# Patient Record
Sex: FEMALE | Race: WHITE | HISPANIC OR LATINO | Employment: UNEMPLOYED | URBAN - METROPOLITAN AREA
[De-identification: names, ages, dates, MRNs, and addresses within clinical notes are randomized per-mention and may not be internally consistent; named-entity substitution may affect disease eponyms.]

---

## 2021-08-06 ENCOUNTER — HOSPITAL ENCOUNTER (EMERGENCY)
Facility: HOSPITAL | Age: 56
Discharge: HOME | End: 2021-08-07
Attending: EMERGENCY MEDICINE | Admitting: EMERGENCY MEDICINE
Payer: COMMERCIAL

## 2021-08-06 DIAGNOSIS — R73.9 HYPERGLYCEMIA: Primary | ICD-10-CM

## 2021-08-06 PROCEDURE — 99285 EMERGENCY DEPT VISIT HI MDM: CPT

## 2021-08-07 VITALS
WEIGHT: 178.57 LBS | HEART RATE: 81 BPM | RESPIRATION RATE: 19 BRPM | TEMPERATURE: 98.4 F | OXYGEN SATURATION: 98 % | SYSTOLIC BLOOD PRESSURE: 173 MMHG | DIASTOLIC BLOOD PRESSURE: 87 MMHG

## 2021-08-07 LAB
ALBUMIN SERPL BCP-MCNC: 3.6 G/DL (ref 3.5–5)
ALP SERPL-CCNC: 159 U/L (ref 46–116)
ALT SERPL W P-5'-P-CCNC: 38 U/L (ref 12–78)
ANION GAP SERPL CALCULATED.3IONS-SCNC: 10 MMOL/L (ref 4–13)
AST SERPL W P-5'-P-CCNC: 12 U/L (ref 5–45)
ATRIAL RATE: 69 BPM
BACTERIA UR QL AUTO: ABNORMAL /HPF
BASE EX.OXY STD BLDV CALC-SCNC: 89.2 % (ref 60–80)
BASE EXCESS BLDV CALC-SCNC: -1.7 MMOL/L
BASOPHILS # BLD AUTO: 0.05 THOUSANDS/ΜL (ref 0–0.1)
BASOPHILS NFR BLD AUTO: 1 % (ref 0–1)
BETA-HYDROXYBUTYRATE: 0.1 MMOL/L
BILIRUB SERPL-MCNC: 0.3 MG/DL (ref 0.2–1)
BILIRUB UR QL STRIP: NEGATIVE
BUN SERPL-MCNC: 21 MG/DL (ref 5–25)
CALCIUM SERPL-MCNC: 8.8 MG/DL (ref 8.3–10.1)
CHLORIDE SERPL-SCNC: 102 MMOL/L (ref 100–108)
CLARITY UR: CLEAR
CO2 SERPL-SCNC: 26 MMOL/L (ref 21–32)
COLOR UR: YELLOW
CREAT SERPL-MCNC: 0.88 MG/DL (ref 0.6–1.3)
EOSINOPHIL # BLD AUTO: 0.1 THOUSAND/ΜL (ref 0–0.61)
EOSINOPHIL NFR BLD AUTO: 1 % (ref 0–6)
ERYTHROCYTE [DISTWIDTH] IN BLOOD BY AUTOMATED COUNT: 13.3 % (ref 11.6–15.1)
GFR SERPL CREATININE-BSD FRML MDRD: 74 ML/MIN/1.73SQ M
GLUCOSE SERPL-MCNC: 183 MG/DL (ref 65–140)
GLUCOSE SERPL-MCNC: 196 MG/DL (ref 65–140)
GLUCOSE UR STRIP-MCNC: ABNORMAL MG/DL
HCO3 BLDV-SCNC: 22.1 MMOL/L (ref 24–30)
HCT VFR BLD AUTO: 42 % (ref 34.8–46.1)
HGB BLD-MCNC: 14 G/DL (ref 11.5–15.4)
HGB UR QL STRIP.AUTO: ABNORMAL
IMM GRANULOCYTES # BLD AUTO: 0.04 THOUSAND/UL (ref 0–0.2)
IMM GRANULOCYTES NFR BLD AUTO: 0 % (ref 0–2)
KETONES UR STRIP-MCNC: NEGATIVE MG/DL
LEUKOCYTE ESTERASE UR QL STRIP: ABNORMAL
LYMPHOCYTES # BLD AUTO: 2.6 THOUSANDS/ΜL (ref 0.6–4.47)
LYMPHOCYTES NFR BLD AUTO: 26 % (ref 14–44)
MCH RBC QN AUTO: 28.9 PG (ref 26.8–34.3)
MCHC RBC AUTO-ENTMCNC: 33.3 G/DL (ref 31.4–37.4)
MCV RBC AUTO: 87 FL (ref 82–98)
MONOCYTES # BLD AUTO: 0.64 THOUSAND/ΜL (ref 0.17–1.22)
MONOCYTES NFR BLD AUTO: 6 % (ref 4–12)
NEUTROPHILS # BLD AUTO: 6.69 THOUSANDS/ΜL (ref 1.85–7.62)
NEUTS SEG NFR BLD AUTO: 66 % (ref 43–75)
NITRITE UR QL STRIP: NEGATIVE
NON-SQ EPI CELLS URNS QL MICRO: ABNORMAL /HPF
NRBC BLD AUTO-RTO: 0 /100 WBCS
O2 CT BLDV-SCNC: 18.8 ML/DL
P AXIS: 47 DEGREES
PCO2 BLDV: 34.7 MM HG (ref 42–50)
PH BLDV: 7.42 [PH] (ref 7.3–7.4)
PH UR STRIP.AUTO: 6 [PH]
PLATELET # BLD AUTO: 287 THOUSANDS/UL (ref 149–390)
PMV BLD AUTO: 10.2 FL (ref 8.9–12.7)
PO2 BLDV: 62.1 MM HG (ref 35–45)
POTASSIUM SERPL-SCNC: 3.5 MMOL/L (ref 3.5–5.3)
PR INTERVAL: 140 MS
PROT SERPL-MCNC: 7.2 G/DL (ref 6.4–8.2)
PROT UR STRIP-MCNC: NEGATIVE MG/DL
QRS AXIS: 32 DEGREES
QRSD INTERVAL: 78 MS
QT INTERVAL: 414 MS
QTC INTERVAL: 443 MS
RBC # BLD AUTO: 4.85 MILLION/UL (ref 3.81–5.12)
RBC #/AREA URNS AUTO: ABNORMAL /HPF
SODIUM SERPL-SCNC: 138 MMOL/L (ref 136–145)
SP GR UR STRIP.AUTO: 1.01 (ref 1–1.03)
T WAVE AXIS: -2 DEGREES
UROBILINOGEN UR QL STRIP.AUTO: 0.2 E.U./DL
VENTRICULAR RATE: 69 BPM
WBC # BLD AUTO: 10.12 THOUSAND/UL (ref 4.31–10.16)
WBC #/AREA URNS AUTO: ABNORMAL /HPF

## 2021-08-07 PROCEDURE — 80053 COMPREHEN METABOLIC PANEL: CPT | Performed by: EMERGENCY MEDICINE

## 2021-08-07 PROCEDURE — 85025 COMPLETE CBC W/AUTO DIFF WBC: CPT | Performed by: EMERGENCY MEDICINE

## 2021-08-07 PROCEDURE — 93010 ELECTROCARDIOGRAM REPORT: CPT | Performed by: INTERNAL MEDICINE

## 2021-08-07 PROCEDURE — 99282 EMERGENCY DEPT VISIT SF MDM: CPT | Performed by: EMERGENCY MEDICINE

## 2021-08-07 PROCEDURE — 96360 HYDRATION IV INFUSION INIT: CPT

## 2021-08-07 PROCEDURE — 36415 COLL VENOUS BLD VENIPUNCTURE: CPT | Performed by: EMERGENCY MEDICINE

## 2021-08-07 PROCEDURE — 82010 KETONE BODYS QUAN: CPT | Performed by: EMERGENCY MEDICINE

## 2021-08-07 PROCEDURE — 82948 REAGENT STRIP/BLOOD GLUCOSE: CPT

## 2021-08-07 PROCEDURE — 93005 ELECTROCARDIOGRAM TRACING: CPT

## 2021-08-07 PROCEDURE — 82805 BLOOD GASES W/O2 SATURATION: CPT | Performed by: EMERGENCY MEDICINE

## 2021-08-07 PROCEDURE — 81001 URINALYSIS AUTO W/SCOPE: CPT | Performed by: EMERGENCY MEDICINE

## 2021-08-07 RX ADMIN — SODIUM CHLORIDE 1000 ML: 0.9 INJECTION, SOLUTION INTRAVENOUS at 00:46

## 2021-08-07 NOTE — ED NOTES
Pt states her CBG was >600 at home   She dosed herself with 20 units of Novolog prior to arrival     Gabrielle Newman, [facility]  08/07/21 2013

## 2021-08-20 ENCOUNTER — HOSPITAL ENCOUNTER (OUTPATIENT)
Facility: HOSPITAL | Age: 56
Setting detail: OBSERVATION
Discharge: HOME | End: 2021-08-21
Attending: EMERGENCY MEDICINE | Admitting: FAMILY MEDICINE
Payer: COMMERCIAL

## 2021-08-20 ENCOUNTER — APPOINTMENT (OUTPATIENT)
Dept: MRI IMAGING | Facility: HOSPITAL | Age: 56
End: 2021-08-20
Payer: COMMERCIAL

## 2021-08-20 ENCOUNTER — APPOINTMENT (EMERGENCY)
Dept: RADIOLOGY | Facility: HOSPITAL | Age: 56
End: 2021-08-20
Payer: COMMERCIAL

## 2021-08-20 ENCOUNTER — APPOINTMENT (EMERGENCY)
Dept: CT IMAGING | Facility: HOSPITAL | Age: 56
End: 2021-08-20
Payer: COMMERCIAL

## 2021-08-20 DIAGNOSIS — Z79.4 TYPE 2 DIABETES MELLITUS WITH STAGE 3 CHRONIC KIDNEY DISEASE, WITH LONG-TERM CURRENT USE OF INSULIN, UNSPECIFIED WHETHER STAGE 3A OR 3B CKD (HCC): ICD-10-CM

## 2021-08-20 DIAGNOSIS — R29.90 STROKE-LIKE SYMPTOMS: ICD-10-CM

## 2021-08-20 DIAGNOSIS — N18.30 TYPE 2 DIABETES MELLITUS WITH STAGE 3 CHRONIC KIDNEY DISEASE, WITH LONG-TERM CURRENT USE OF INSULIN, UNSPECIFIED WHETHER STAGE 3A OR 3B CKD (HCC): ICD-10-CM

## 2021-08-20 DIAGNOSIS — E11.22 TYPE 2 DIABETES MELLITUS WITH STAGE 3 CHRONIC KIDNEY DISEASE, WITH LONG-TERM CURRENT USE OF INSULIN, UNSPECIFIED WHETHER STAGE 3A OR 3B CKD (HCC): ICD-10-CM

## 2021-08-20 DIAGNOSIS — I67.4 HYPERTENSIVE ENCEPHALOPATHY: ICD-10-CM

## 2021-08-20 DIAGNOSIS — R51.9 HEADACHE: Primary | ICD-10-CM

## 2021-08-20 DIAGNOSIS — I10 ESSENTIAL HYPERTENSION: ICD-10-CM

## 2021-08-20 DIAGNOSIS — I10 HYPERTENSION: ICD-10-CM

## 2021-08-20 PROBLEM — Z87.891 SMOKING HISTORY: Status: ACTIVE | Noted: 2021-03-21

## 2021-08-20 PROBLEM — F32.A DEPRESSION: Status: ACTIVE | Noted: 2021-03-21

## 2021-08-20 PROBLEM — I16.0 HYPERTENSIVE URGENCY, MALIGNANT: Status: ACTIVE | Noted: 2021-08-20

## 2021-08-20 LAB
ANION GAP SERPL CALCULATED.3IONS-SCNC: 8 MMOL/L (ref 4–13)
APTT PPP: 29 SECONDS (ref 23–37)
BUN SERPL-MCNC: 16 MG/DL (ref 5–25)
CALCIUM SERPL-MCNC: 8.5 MG/DL (ref 8.3–10.1)
CHLORIDE SERPL-SCNC: 105 MMOL/L (ref 100–108)
CHOLEST SERPL-MCNC: 227 MG/DL (ref 50–200)
CO2 SERPL-SCNC: 26 MMOL/L (ref 21–32)
CREAT SERPL-MCNC: 1.07 MG/DL (ref 0.6–1.3)
ERYTHROCYTE [DISTWIDTH] IN BLOOD BY AUTOMATED COUNT: 13.6 % (ref 11.6–15.1)
GFR SERPL CREATININE-BSD FRML MDRD: 58 ML/MIN/1.73SQ M
GLUCOSE SERPL-MCNC: 162 MG/DL (ref 65–140)
HCT VFR BLD AUTO: 42.6 % (ref 34.8–46.1)
HDLC SERPL-MCNC: 48 MG/DL
HGB BLD-MCNC: 13.5 G/DL (ref 11.5–15.4)
INR PPP: 0.89 (ref 0.84–1.19)
LDLC SERPL CALC-MCNC: 149 MG/DL (ref 0–100)
MCH RBC QN AUTO: 28.7 PG (ref 26.8–34.3)
MCHC RBC AUTO-ENTMCNC: 31.7 G/DL (ref 31.4–37.4)
MCV RBC AUTO: 90 FL (ref 82–98)
PLATELET # BLD AUTO: 301 THOUSANDS/UL (ref 149–390)
PMV BLD AUTO: 10 FL (ref 8.9–12.7)
POTASSIUM SERPL-SCNC: 4.3 MMOL/L (ref 3.5–5.3)
PROTHROMBIN TIME: 11.6 SECONDS (ref 11.6–14.5)
RBC # BLD AUTO: 4.71 MILLION/UL (ref 3.81–5.12)
SARS-COV-2 RNA RESP QL NAA+PROBE: NEGATIVE
SODIUM SERPL-SCNC: 139 MMOL/L (ref 136–145)
TRIGL SERPL-MCNC: 148 MG/DL
TROPONIN I SERPL-MCNC: <0.02 NG/ML
WBC # BLD AUTO: 8.94 THOUSAND/UL (ref 4.31–10.16)

## 2021-08-20 PROCEDURE — 85027 COMPLETE CBC AUTOMATED: CPT | Performed by: PHYSICIAN ASSISTANT

## 2021-08-20 PROCEDURE — U0003 INFECTIOUS AGENT DETECTION BY NUCLEIC ACID (DNA OR RNA); SEVERE ACUTE RESPIRATORY SYNDROME CORONAVIRUS 2 (SARS-COV-2) (CORONAVIRUS DISEASE [COVID-19]), AMPLIFIED PROBE TECHNIQUE, MAKING USE OF HIGH THROUGHPUT TECHNOLOGIES AS DESCRIBED BY CMS-2020-01-R: HCPCS | Performed by: PHYSICIAN ASSISTANT

## 2021-08-20 PROCEDURE — 36415 COLL VENOUS BLD VENIPUNCTURE: CPT | Performed by: PHYSICIAN ASSISTANT

## 2021-08-20 PROCEDURE — 71045 X-RAY EXAM CHEST 1 VIEW: CPT

## 2021-08-20 PROCEDURE — 96374 THER/PROPH/DIAG INJ IV PUSH: CPT

## 2021-08-20 PROCEDURE — 99285 EMERGENCY DEPT VISIT HI MDM: CPT | Performed by: PHYSICIAN ASSISTANT

## 2021-08-20 PROCEDURE — 70498 CT ANGIOGRAPHY NECK: CPT

## 2021-08-20 PROCEDURE — 99285 EMERGENCY DEPT VISIT HI MDM: CPT

## 2021-08-20 PROCEDURE — 85730 THROMBOPLASTIN TIME PARTIAL: CPT | Performed by: PHYSICIAN ASSISTANT

## 2021-08-20 PROCEDURE — 99205 OFFICE O/P NEW HI 60 MIN: CPT | Performed by: PSYCHIATRY & NEUROLOGY

## 2021-08-20 PROCEDURE — 70551 MRI BRAIN STEM W/O DYE: CPT

## 2021-08-20 PROCEDURE — 80048 BASIC METABOLIC PNL TOTAL CA: CPT | Performed by: PHYSICIAN ASSISTANT

## 2021-08-20 PROCEDURE — 99219 PR INITIAL OBSERVATION CARE/DAY 50 MINUTES: CPT | Performed by: FAMILY MEDICINE

## 2021-08-20 PROCEDURE — 80061 LIPID PANEL: CPT

## 2021-08-20 PROCEDURE — 93005 ELECTROCARDIOGRAM TRACING: CPT

## 2021-08-20 PROCEDURE — 85610 PROTHROMBIN TIME: CPT | Performed by: PHYSICIAN ASSISTANT

## 2021-08-20 PROCEDURE — 84484 ASSAY OF TROPONIN QUANT: CPT | Performed by: PHYSICIAN ASSISTANT

## 2021-08-20 PROCEDURE — G1004 CDSM NDSC: HCPCS

## 2021-08-20 PROCEDURE — 70496 CT ANGIOGRAPHY HEAD: CPT

## 2021-08-20 PROCEDURE — U0005 INFEC AGEN DETEC AMPLI PROBE: HCPCS | Performed by: PHYSICIAN ASSISTANT

## 2021-08-20 RX ORDER — QUETIAPINE FUMARATE 100 MG/1
100 TABLET, FILM COATED ORAL DAILY
COMMUNITY
Start: 2021-05-24

## 2021-08-20 RX ORDER — FAMOTIDINE 40 MG/1
40 TABLET, FILM COATED ORAL DAILY
COMMUNITY
Start: 2021-06-03

## 2021-08-20 RX ORDER — VALSARTAN 320 MG/1
320 TABLET ORAL DAILY
COMMUNITY

## 2021-08-20 RX ORDER — AMITRIPTYLINE HYDROCHLORIDE 25 MG/1
50 TABLET, FILM COATED ORAL DAILY
Status: DISCONTINUED | OUTPATIENT
Start: 2021-08-21 | End: 2021-08-21 | Stop reason: HOSPADM

## 2021-08-20 RX ORDER — VENLAFAXINE HYDROCHLORIDE 150 MG/1
150 CAPSULE, EXTENDED RELEASE ORAL DAILY
Status: DISCONTINUED | OUTPATIENT
Start: 2021-08-21 | End: 2021-08-21 | Stop reason: HOSPADM

## 2021-08-20 RX ORDER — HEPARIN SODIUM 5000 [USP'U]/ML
5000 INJECTION, SOLUTION INTRAVENOUS; SUBCUTANEOUS EVERY 8 HOURS SCHEDULED
Status: DISCONTINUED | OUTPATIENT
Start: 2021-08-20 | End: 2021-08-21 | Stop reason: HOSPADM

## 2021-08-20 RX ORDER — QUETIAPINE FUMARATE 100 MG/1
100 TABLET, FILM COATED ORAL DAILY
Status: DISCONTINUED | OUTPATIENT
Start: 2021-08-21 | End: 2021-08-21 | Stop reason: HOSPADM

## 2021-08-20 RX ORDER — ASPIRIN 81 MG/1
81 TABLET ORAL DAILY
Status: DISCONTINUED | OUTPATIENT
Start: 2021-08-21 | End: 2021-08-21 | Stop reason: HOSPADM

## 2021-08-20 RX ORDER — ASPIRIN 325 MG
325 TABLET ORAL ONCE
Status: COMPLETED | OUTPATIENT
Start: 2021-08-20 | End: 2021-08-20

## 2021-08-20 RX ORDER — VENLAFAXINE HYDROCHLORIDE 150 MG/1
150 CAPSULE, EXTENDED RELEASE ORAL DAILY
COMMUNITY
Start: 2021-05-24

## 2021-08-20 RX ORDER — GABAPENTIN 300 MG/1
600 CAPSULE ORAL 2 TIMES DAILY
Status: DISCONTINUED | OUTPATIENT
Start: 2021-08-20 | End: 2021-08-21 | Stop reason: HOSPADM

## 2021-08-20 RX ORDER — FAMOTIDINE 20 MG/1
40 TABLET, FILM COATED ORAL DAILY
Status: DISCONTINUED | OUTPATIENT
Start: 2021-08-21 | End: 2021-08-21 | Stop reason: HOSPADM

## 2021-08-20 RX ORDER — BISMUTH SUBCITRATE POTASSIUM, METRONIDAZOLE, TETRACYCLINE HYDROCHLORIDE 140; 125; 125 MG/1; MG/1; MG/1
3 CAPSULE ORAL 4 TIMES DAILY
COMMUNITY
Start: 2021-08-05

## 2021-08-20 RX ORDER — VENLAFAXINE HYDROCHLORIDE 37.5 MG/1
37.5 CAPSULE, EXTENDED RELEASE ORAL DAILY
COMMUNITY
Start: 2021-05-24

## 2021-08-20 RX ORDER — LABETALOL 20 MG/4 ML (5 MG/ML) INTRAVENOUS SYRINGE
10 ONCE
Status: COMPLETED | OUTPATIENT
Start: 2021-08-20 | End: 2021-08-20

## 2021-08-20 RX ORDER — ATORVASTATIN CALCIUM 40 MG/1
40 TABLET, FILM COATED ORAL EVERY EVENING
Status: DISCONTINUED | OUTPATIENT
Start: 2021-08-20 | End: 2021-08-21 | Stop reason: HOSPADM

## 2021-08-20 RX ORDER — VENLAFAXINE HYDROCHLORIDE 37.5 MG/1
37.5 CAPSULE, EXTENDED RELEASE ORAL DAILY
Status: DISCONTINUED | OUTPATIENT
Start: 2021-08-21 | End: 2021-08-21 | Stop reason: HOSPADM

## 2021-08-20 RX ORDER — AMITRIPTYLINE HYDROCHLORIDE 50 MG/1
50 TABLET, FILM COATED ORAL DAILY
COMMUNITY
Start: 2021-05-25

## 2021-08-20 RX ORDER — OMEPRAZOLE 40 MG/1
40 CAPSULE, DELAYED RELEASE ORAL DAILY
COMMUNITY
Start: 2021-06-03

## 2021-08-20 RX ORDER — PANTOPRAZOLE SODIUM 40 MG/1
40 TABLET, DELAYED RELEASE ORAL
Status: DISCONTINUED | OUTPATIENT
Start: 2021-08-21 | End: 2021-08-21 | Stop reason: HOSPADM

## 2021-08-20 RX ADMIN — HEPARIN SODIUM 5000 UNITS: 5000 INJECTION INTRAVENOUS; SUBCUTANEOUS at 22:27

## 2021-08-20 RX ADMIN — IOHEXOL 100 ML: 350 INJECTION, SOLUTION INTRAVENOUS at 18:04

## 2021-08-20 RX ADMIN — GABAPENTIN 600 MG: 300 CAPSULE ORAL at 22:27

## 2021-08-20 RX ADMIN — LABETALOL 20 MG/4 ML (5 MG/ML) INTRAVENOUS SYRINGE 10 MG: at 18:59

## 2021-08-20 RX ADMIN — ATORVASTATIN CALCIUM 40 MG: 40 TABLET, FILM COATED ORAL at 20:50

## 2021-08-20 RX ADMIN — ASPIRIN 325 MG ORAL TABLET 325 MG: 325 PILL ORAL at 19:27

## 2021-08-20 NOTE — ED PROVIDER NOTES
History  Chief Complaint   Patient presents with    Headache     headache for 3 days, no relief with tylenol q6hr  +nausea     Glenna Fay is a 54-year-old female with past medical history including hypertension, hyperlipidemia, diabetes, fibromyalgia, and tobacco use presenting to the emergency department for evaluation with chief complaint of headache  Patient reports gradual onset of generalized headache 3 days ago, described as pressure, unrelieved with tylenol  She states that last night she was awoken from sleep due to headache which the patient reports was associated with double vision that has since resolved  She presently has no visual deficits to include vision field cuts  She states that she has been experiencing nausea without episodes of vomiting  The patient is significantly hypertensive on arrival, stating she has not taken any of her medications in about 3 days, as she was reportedly told by her providers in [place] to stop taking her medications due to to "liver disease" though is unable to provide further clarification of this  The patient admits that she does not routinely check her blood pressures at home to have established a baseline  The patient additionally states that she had noticed the onset of right-sided facial droop this AM at 10am  The patient has a past surgical history of cervical and lumbar sacral disc surgeries with residual right upper and lower extremity motor and sensory deficits at baseline, though she finds this is worsened today  The patient was ambulatory to her exam bed today finding that her gait is largely unchanged  She denies dizziness, lightheadedness, presyncope or syncopal episodes  She denies sudden onset of maximal intensity headache  She denies chest pain, shortness of breath, palpitations, abdominal pain, back pain  Denies recent illness  Denies recent falls or trauma  She has no prior CVA history  She offers no further complaints at this time            Prior to Admission Medications   Prescriptions Last Dose Informant Patient Reported? Taking? NIFEDIPINE PO Past Week at Unknown time  Yes Yes   Sig: Take by mouth   QUEtiapine (SEROquel) 100 mg tablet Past Week at Unknown time  Yes Yes   Sig: Take 100 mg by mouth daily   amitriptyline (ELAVIL) 50 mg tablet Past Week at Unknown time  Yes Yes   Sig: Take 50 mg by mouth daily   bismuth-metronidazole-tetracycline (Pylera) 140-125-125 MG per capsule Past Week at Unknown time  Yes Yes   Sig: Take 3 capsules by mouth 4 (four) times a day   famotidine (PEPCID) 40 MG tablet Past Week at Unknown time  Yes Yes   Sig: Take 40 mg by mouth daily   insulin aspart (NovoLOG) 100 units/mL injection Past Week at Unknown time  Yes Yes   Sig: Inject under the skin   insulin detemir (LEVEMIR) 100 units/mL subcutaneous injection Past Week at Unknown time  Yes Yes   Sig: Inject under the skin   metFORMIN (GLUCOPHAGE) 1000 MG tablet Past Week at Unknown time  Yes Yes   Sig: Take 1,000 mg by mouth 2 (two) times a day with meals   omeprazole (PriLOSEC) 40 MG capsule Past Week at Unknown time  Yes Yes   Sig: Take 40 mg by mouth daily   valsartan (DIOVAN) 320 MG tablet Past Week at Unknown time  Yes Yes   Sig: Take 320 mg by mouth daily   venlafaxine (EFFEXOR-XR) 150 mg 24 hr capsule Past Week at Unknown time  Yes Yes   Sig: Take 150 mg by mouth daily   venlafaxine (EFFEXOR-XR) 37 5 mg 24 hr capsule Past Week at Unknown time  Yes Yes   Sig: Take 37 5 mg by mouth daily      Facility-Administered Medications: None       Past Medical History:   Diagnosis Date    Hypertension     Type 2 diabetes mellitus ([facility] [address] )        Past Surgical History:   Procedure Laterality Date    BACK SURGERY         History reviewed  No pertinent family history  I have reviewed and agree with the history as documented      E-Cigarette/Vaping     E-Cigarette/Vaping Substances     Social History     Tobacco Use    Smoking status: Current Every Day Smoker     Packs/day: 0 50  Smokeless tobacco: Never Used   Substance Use Topics    Alcohol use: Not on file    Drug use: Not on file       Review of Systems   Constitutional: Negative for chills and fever  Respiratory: Negative for shortness of breath  Cardiovascular: Negative for chest pain  Gastrointestinal: Positive for nausea  Negative for vomiting  Musculoskeletal: Negative for neck pain and neck stiffness  Skin: Negative for rash  Neurological: Positive for facial asymmetry, weakness, numbness and headaches  Negative for dizziness, seizures, syncope and light-headedness  All other systems reviewed and are negative  Physical Exam  Physical Exam  Vitals and nursing note reviewed  Constitutional:       General: She is not in acute distress  Appearance: Normal appearance  She is well-developed  She is not ill-appearing, toxic-appearing or diaphoretic  Comments: Elevated BMI   HENT:      Head: Normocephalic and atraumatic  Jaw: No tenderness  Comments: No tenderness to palpation overlying the temporal arteries or bounding pulsations  Loss of the right nasal labial fold on inspection       Right Ear: External ear normal    Eyes:      General: No visual field deficit  Extraocular Movements: Extraocular movements intact  Conjunctiva/sclera: Conjunctivae normal       Pupils: Pupils are equal, round, and reactive to light  Cardiovascular:      Rate and Rhythm: Normal rate and regular rhythm  Pulses: Normal pulses  Heart sounds: Normal heart sounds  Pulmonary:      Effort: Pulmonary effort is normal  No respiratory distress  Breath sounds: Normal breath sounds  No wheezing  Abdominal:      Palpations: Abdomen is soft  Tenderness: There is no abdominal tenderness  There is no guarding or rebound  Musculoskeletal:         General: Normal range of motion  Cervical back: Normal range of motion and neck supple  Right lower leg: No edema        Left lower leg: No edema  Skin:     General: Skin is warm and dry  Capillary Refill: Capillary refill takes less than 2 seconds  Neurological:      Mental Status: She is alert and oriented to person, place, and time  Cranial Nerves: Facial asymmetry present  No dysarthria  Sensory: Sensory deficit present  Motor: Weakness and pronator drift present  No tremor or seizure activity  Coordination: Heel to Shin Test abnormal  Finger-Nose-Finger Test normal       Deep Tendon Reflexes: Reflexes are normal and symmetric  Reflexes normal       Comments: Patient awake, alert, oriented  Speech fluent, no aphasia or dysarthria  PERRL, EOMI, no nystagmus, visual fields full   The patient has decreased sensation to dull pressure in the right upper and lower extremities, as well as weakness with 4/5 strength in the rue/rle which she reports is essentially chronic from prior h/o spinal surgery   Psychiatric:         Mood and Affect: Mood normal          Vital Signs  ED Triage Vitals   Temperature Pulse Respirations Blood Pressure SpO2   08/20/21 1511 08/20/21 1511 08/20/21 1511 08/20/21 1511 08/20/21 1511   97 9 °F (36 6 °C) 65 15 (!) 212/99 99 %      Temp Source Heart Rate Source Patient Position - Orthostatic VS BP Location FiO2 (%)   08/20/21 1511 08/20/21 1511 08/20/21 1724 08/20/21 1511 --   Oral Monitor Sitting Left arm       Pain Score       08/20/21 1511       7           Vitals:    08/21/21 1746 08/21/21 1747 08/21/21 1748 08/21/21 1749   BP:       Pulse: 57 56 57 55   Patient Position - Orthostatic VS:             Visual Acuity  Visual Acuity      Most Recent Value   L Pupil Size (mm)  2   R Pupil Size (mm)  2          ED Medications  Medications   iohexol (OMNIPAQUE) 350 MG/ML injection (SINGLE-DOSE) 100 mL (100 mL Intravenous Given 8/20/21 1804)   Labetalol HCl (NORMODYNE) injection 10 mg (10 mg Intravenous Given 8/20/21 1859)   aspirin tablet 325 mg (325 mg Oral Given 8/20/21 1927)       Diagnostic Studies  Results Reviewed     Procedure Component Value Units Date/Time    Fingerstick Glucose (POCT) [[id number]]  (Abnormal) Collected: 08/21/21 1623    Lab Status: Final result Updated: 08/21/21 1627     POC Glucose 171 mg/dl     Fingerstick Glucose (POCT) [[id number]]  (Normal) Collected: 08/21/21 1139    Lab Status: Final result Updated: 08/21/21 1145     POC Glucose 140 mg/dl     Fingerstick Glucose (POCT) [[id number]]  (Abnormal) Collected: 08/21/21 0850    Lab Status: Final result Updated: 08/21/21 0858     POC Glucose 142 mg/dl     Basic metabolic panel [[id number]]  (Abnormal) Collected: 08/21/21 0506    Lab Status: Final result Specimen: Blood from Arm, Right Updated: 08/21/21 0606     Sodium 140 mmol/L      Potassium 3 9 mmol/L      Chloride 104 mmol/L      CO2 26 mmol/L      ANION GAP 10 mmol/L      BUN 12 mg/dL      Creatinine 0 83 mg/dL      Glucose 157 mg/dL      Calcium 8 1 mg/dL      eGFR 79 ml/min/1 73sq m     Narrative:      [facility] guidelines for Chronic Kidney Disease (CKD):     Stage 1 with normal or high GFR (GFR > 90 mL/min/1 73 square meters)    Stage 2 Mild CKD (GFR = 60-89 mL/min/1 73 square meters)    Stage 3A Moderate CKD (GFR = 45-59 mL/min/1 73 square meters)    Stage 3B Moderate CKD (GFR = 30-44 mL/min/1 73 square meters)    Stage 4 Severe CKD (GFR = 15-29 mL/min/1 73 square meters)    Stage 5 End Stage CKD (GFR <15 mL/min/1 73 square meters)  Note: GFR calculation is accurate only with a steady state creatinine    CBC and differential [[id number]] Collected: 08/21/21 0507    Lab Status: Final result Specimen: Blood from Arm, Right Updated: 08/21/21 0532     WBC 7 55 Thousand/uL      RBC 4 49 Million/uL      Hemoglobin 12 8 g/dL      Hematocrit 40 5 %      MCV 90 fL      MCH 28 5 pg      MCHC 31 6 g/dL      RDW 13 8 %      MPV 10 5 fL      Platelets 950 Thousands/uL      nRBC 0 /100 WBCs      Neutrophils Relative 48 %      Immat GRANS % 1 % Lymphocytes Relative 41 %      Monocytes Relative 7 %      Eosinophils Relative 2 %      Basophils Relative 1 %      Neutrophils Absolute 3 69 Thousands/µL      Immature Grans Absolute 0 04 Thousand/uL      Lymphocytes Absolute 3 12 Thousands/µL      Monocytes Absolute 0 51 Thousand/µL      Eosinophils Absolute 0 14 Thousand/µL      Basophils Absolute 0 05 Thousands/µL     Fingerstick Glucose (POCT) [[id number]]  (Abnormal) Collected: 08/21/21 0037    Lab Status: Final result Updated: 08/21/21 0041     POC Glucose 157 mg/dl     Lipid Panel with Direct LDL reflex [[id number]]  (Abnormal) Collected: 08/20/21 2243    Lab Status: Final result Specimen: Blood from Arm, Left Updated: 08/20/21 2318     Cholesterol 227 mg/dL      Triglycerides 148 mg/dL      HDL, Direct 48 mg/dL      LDL Calculated 149 mg/dL     Novel Coronavirus (Covid-19),PCR [facility] - 2 hour stat [[id number]]  (Normal) Collected: 08/20/21 1812    Lab Status: Final result Specimen: Nares from Nose Updated: 08/20/21 1914     SARS-CoV-2 Negative    Narrative: The specimen collection materials, transport medium, and/or testing methodology utilized in the production of these test results have been proven to be reliable in a limited validation with an abbreviated program under the Emergency Utilization Authorization provided by the FDA  Testing reported as "Presumptive positive" will be confirmed with secondary testing to ensure result accuracy  Clinical caution and judgement should be used with the interpretation of these results with consideration of the clinical impression and other laboratory testing  Testing reported as "Positive" or "Negative" has been proven to be accurate according to standard laboratory validation requirements  All testing is performed with control materials showing appropriate reactivity at standard intervals        Troponin I [[id number]]  (Normal) Collected: 08/20/21 1751    Lab Status: Final result Specimen: Blood from Arm, Left Updated: 08/20/21 1826     Troponin I <0 02 ng/mL     Protime-INR [[id number]]  (Normal) Collected: 08/20/21 1751    Lab Status: Final result Specimen: Blood from Arm, Left Updated: 08/20/21 1820     Protime 11 6 seconds      INR 0 89    APTT [[id number]]  (Normal) Collected: 08/20/21 1751    Lab Status: Final result Specimen: Blood from Arm, Left Updated: 08/20/21 1820     PTT 29 seconds     Basic metabolic panel [[id number]]  (Abnormal) Collected: 08/20/21 1751    Lab Status: Final result Specimen: Blood from Arm, Left Updated: 08/20/21 1819     Sodium 139 mmol/L      Potassium 4 3 mmol/L      Chloride 105 mmol/L      CO2 26 mmol/L      ANION GAP 8 mmol/L      BUN 16 mg/dL      Creatinine 1 07 mg/dL      Glucose 162 mg/dL      Calcium 8 5 mg/dL      eGFR 58 ml/min/1 73sq m     Narrative:      [facility] guidelines for Chronic Kidney Disease (CKD):     Stage 1 with normal or high GFR (GFR > 90 mL/min/1 73 square meters)    Stage 2 Mild CKD (GFR = 60-89 mL/min/1 73 square meters)    Stage 3A Moderate CKD (GFR = 45-59 mL/min/1 73 square meters)    Stage 3B Moderate CKD (GFR = 30-44 mL/min/1 73 square meters)    Stage 4 Severe CKD (GFR = 15-29 mL/min/1 73 square meters)    Stage 5 End Stage CKD (GFR <15 mL/min/1 73 square meters)  Note: GFR calculation is accurate only with a steady state creatinine    CBC and Platelet [[id number]]  (Normal) Collected: 08/20/21 1751    Lab Status: Final result Specimen: Blood from Arm, Left Updated: 08/20/21 1758     WBC 8 94 Thousand/uL      RBC 4 71 Million/uL      Hemoglobin 13 5 g/dL      Hematocrit 42 6 %      MCV 90 fL      MCH 28 7 pg      MCHC 31 7 g/dL      RDW 13 6 %      Platelets 188 Thousands/uL      MPV 10 0 fL                  MRI brain wo contrast   Final Result by Randa Baker DO (08/21 6847)      Normal       Workstation performed: [id number]         XR stroke alert portable chest   Final Result by Steve Perez DO (08/21 4475) No acute cardiopulmonary disease  Workstation performed: [id number]         CTA stroke alert (head/neck)   Final Result by Vandana Quarles MD (08/20 1843)      No evidence of hemodynamic significant stenosis, aneurysm or dissection  I personally discussed this study with Enid Rivera on 8/20/2021 at 6:00 PM                         Workstation performed: [id number]         CT stroke alert brain   Final Result by Vandana Quarles MD (08/20 1811)      No acute intracranial abnormality  I personally discussed this study with Enid Rivera on 8/20/2021 at 6:00 PM                 Workstation performed: [id number]                    Procedures  ECG 12 Lead Documentation Only    Date/Time: 8/20/2021 7:32 PM  Performed by: Carlyle García PA-C  Authorized by: Carlyle García PA-C     Indications / Diagnosis:  Htn  Previous ECG:     Previous ECG:  Unavailable  Interpretation:     Interpretation: normal    Rate:     ECG rate:  51  Rhythm:     Rhythm: sinus bradycardia    Ectopy:     Ectopy: none    QRS:     QRS axis:  Normal    QRS intervals:  Normal  Conduction:     Conduction: normal    ST segments:     ST segments:  Normal  T waves:     T waves: normal               ED Course                 Stroke Assessment     Row Name 08/20/21 1810             NIH Stroke Scale    Interval  --      Level of Consciousness (1a )  0      LOC Questions (1b )  0      LOC Commands (1c )  0      Best Gaze (2 )  0      Visual (3 )  0      Facial Palsy (4 )  1      Motor Arm, Left (5a )  0      Motor Arm, Right (5b )  1      Motor Leg, Left (6a )  0      Motor Leg, Right (6b )  1      Limb Ataxia (7 )  2      Sensory (8 )  1      Best Language (9 )  0      Dysarthria (10 )  0      Extinction and Inattention (11 ) (Formerly Neglect)  0      Total  6          First Filed Value   TPA Decision  Patient not a TPA candidate     Patient is not a candidate options  Unclear time of onset outside appropriate time window  MDM  Number of Diagnoses or Management Options  Headache: new and requires workup  Hypertension  Stroke-like symptoms  Diagnosis management comments: This is a 49-year-old female arriving ambulatory to the emergency department for evaluation and treatment of headache, possibly in setting of significant hypertension on arrival   Patient states she was told by her providers in [place] to discontinue all of her medications due to unspecified liver disease which included her antihypertensives and anti hyperglycemics, and she has not taken any medications over the past 3 days  Describes gradual onset of generalized headache, described as pressure  The patient states that she had noticed a right-sided facial droop this morning at approximately 10:00 a m  Nitin Aldo Also complaining or worsening weakness in the rue/rle  Differential diagnosis includes but not limited to: stroke alert initiated, imaging to assess for acute intracranial abnormalities or vascular process; hypertensive emergency, hypertensive urgency, tension headache, migraine, doubt temporal arteritis, dehydration, electrolyte derangement, renal impairment, medication noncompliance, doubt infection    Initial ED plan: stroke alert, admission    Final ED Assessment:  Vital signs on presentation notable for hypertension, examination as above  Due to onset of facial droop this morning at 10am, stroke-alert initiated  All labs and imaging independently reviewed with imaging interpreted by the radiologist   There are no significant lab findings, random glucose 162  CT/CTA unremarkable  Given history of headache x 3 days, patient not felt to be a tpa candidate due to unclear time of symptom onset  Patient provided IV labetalol for management of hypertension  She was updated of all lab and imaging results with plan for hospital admission which she was understanding and agreeable with    The case was discussed with Dr Zuhair Weiss, CECILIA, who accepts the patient for hospital admission  Bridging orders placed  Amount and/or Complexity of Data Reviewed  Clinical lab tests: ordered and reviewed  Tests in the radiology section of CPT®: ordered and reviewed  Review and summarize past medical records: yes  Discuss the patient with other providers: yes (Neurology)  Independent visualization of images, tracings, or specimens: yes    Risk of Complications, Morbidity, and/or Mortality  Presenting problems: high  Diagnostic procedures: high  Management options: high    Patient Progress  Patient progress: stable      Disposition  Final diagnoses:   Headache   Stroke-like symptoms   Hypertension     Time reflects when diagnosis was documented in both MDM as applicable and the Disposition within this note     Time User Action Codes Description Comment    8/20/2021  5:44 PM Sowmya Ades Add [R51 9] Headache     8/20/2021  7:01 PM Sowmya Ades Add [R29 90] Stroke-like symptoms     8/20/2021  7:01 PM Sowmya Ades Add [I10] Hypertension     8/20/2021  8:26 PM Cynthia Fernandez Add [E11 22,  N18 30,  Z79 4] Type 2 diabetes mellitus with stage 3 chronic kidney disease, with long-term current use of insulin, unspecified whether stage 3a or 3b CKD ([facility] [address] )     8/21/2021  5:09 PM Marky Kraft Add [I10] Essential hypertension     8/21/2021  5:19 PM Olga Lou Add [I67 4] Hypertensive encephalopathy       ED Disposition     ED Disposition Condition Date/Time Comment    Admit Stable Fri Aug 20, 2021  7:01 PM Case was discussed with Dr Pérez Reynolds and the patient's admission status was agreed to be Admission Status: observation status to the service of Dr Pérez Reynolds           Follow-up Information     Follow up With Specialties Details Why Contact Info Additional Information    PCP  Call in 1 week(s)       [facility] [address] Neurology Schedule an appointment as soon as possible for a visit in 4 week(s)  Mandeep Nunez [address] [facility] [address] [id number]  [address] [facility] [address] [facility], [address], [address] [facility]          Discharge Medication List as of 8/21/2021  5:15 PM      START taking these medications    Details   acetaminophen (TYLENOL) 325 mg tablet Take 2 tablets (650 mg total) by mouth every 6 (six) hours as needed for mild pain, headaches or fever for up to 5 days, Starting Sat 8/21/2021, Until Thu 8/26/2021 at 2359, No Print      amLODIPine (NORVASC) 10 mg tablet Take 1 tablet (10 mg total) by mouth daily, Starting Sun 8/22/2021, Until Tue 9/21/2021, Normal      aspirin (ECOTRIN LOW STRENGTH) 81 mg EC tablet Take 1 tablet (81 mg total) by mouth daily, Starting Sun 8/22/2021, Until Tue 9/21/2021, Normal      atorvastatin (LIPITOR) 40 mg tablet Take 1 tablet (40 mg total) by mouth every evening, Starting Sat 8/21/2021, Until Mon 9/20/2021, Normal      hydrochlorothiazide (HYDRODIURIL) 12 5 mg tablet Take 1 tablet (12 5 mg total) by mouth daily, Starting Sun 8/22/2021, Until Tue 9/21/2021, Normal         CONTINUE these medications which have NOT CHANGED    Details   amitriptyline (ELAVIL) 50 mg tablet Take 50 mg by mouth daily, Starting Tue 5/25/2021, Historical Med      bismuth-metronidazole-tetracycline (Pylera) 140-125-125 MG per capsule Take 3 capsules by mouth 4 (four) times a day, Starting Thu 8/5/2021, Historical Med      famotidine (PEPCID) 40 MG tablet Take 40 mg by mouth daily, Starting Thu 6/3/2021, Historical Med      insulin aspart (NovoLOG) 100 units/mL injection Inject under the skin, Historical Med      insulin detemir (LEVEMIR) 100 units/mL subcutaneous injection Inject under the skin, Historical Med      metFORMIN (GLUCOPHAGE) 1000 MG tablet Take 1,000 mg by mouth 2 (two) times a day with meals, Historical Med      omeprazole (PriLOSEC) 40 MG capsule Take 40 mg by mouth daily, Starting Thu 6/3/2021, Historical Med      QUEtiapine (SEROquel) 100 mg tablet Take 100 mg by mouth daily, Starting Mon 5/24/2021, Historical Med      valsartan (DIOVAN) 320 MG tablet Take 320 mg by mouth daily, Historical Med      !! venlafaxine (EFFEXOR-XR) 150 mg 24 hr capsule Take 150 mg by mouth daily, Starting Mon 5/24/2021, Historical Med      !! venlafaxine (EFFEXOR-XR) 37 5 mg 24 hr capsule Take 37 5 mg by mouth daily, Starting Mon 5/24/2021, Historical Med       !! - Potential duplicate medications found  Please discuss with provider  STOP taking these medications       NIFEDIPINE PO Comments:   Reason for Stopping:             Outpatient Discharge Orders   Ambulatory referral to Cardiology   Standing Status: Future Standing Exp  Date: 08/21/22      Call provider for:  persistent nausea or vomiting     Call provider for:  severe uncontrolled pain     Call provider for:  persistent dizziness or light-headedness     Echo complete with contrast if indicated   Standing Status: Future Standing Exp   Date: 08/21/25       PDMP Review     None          ED Provider  Electronically Signed by           Melodie Rceinos PA-C  08/30/21 0835

## 2021-08-20 NOTE — ED NOTES
Admit attending bedside and aware of recent bradycardia with pt more so since labetalol administration  Pt with no complaints   Attending had reviewed an EKG that was done during initial onset of bradycardia (HR =50)     Bruce Carias RN  08/20/21 1946

## 2021-08-20 NOTE — ASSESSMENT & PLAN NOTE
This 51-year-old patient with a complicated past medical history including both hypertension hyperlipidemia diabetes in addition to rheumatoid and fibromyalgia with upper and lower back surgeries in her history presents to the hospital today with complaints of a headache  She was noted to have a right resting facial droop apparently at 10:00 a m  This morning  She later presented to the ED at 5:00 a m  This afternoon and she was noted to have markedly elevated blood pressures  Her blood pressure 1 point was 932 systolic  She has had a CT and a CTA consistent with that of a stroke workup and they are both clear  She will be admitted overnight for a stroke workup

## 2021-08-20 NOTE — CONSULTS
[facility]  Neurology Consult- Bryon Zavaleta 1965, 64 y o  female   MRN: [id number] Unit/Bed#: FT 02 Encounter: [id number]    Consult to neurology  Consult performed by: MARTY Lopez Caro  Consult ordered by: Hernan Guadarrama PA-C      Physician Requesting Consult:  ED doc  Reason for Consult / Principal Problem:  Headache and facial weakness  Time last known well:  Last evening  Stroke alert called:  5:45 p m  Neurology stroke alert response:  5:45 p m  We were in the department  Hx and PE limited by:  None  Review of previous medical records was completed, including records from [facility]   TPA  Decision: Patient not a TPA candidate  Unclear time of onset outside appropriate time window  and Symptoms resolved/clearly non disabling  Hypertensive urgency, malignant  Assessment & Plan  This 59-year-old patient with a complicated past medical history including both hypertension hyperlipidemia diabetes in addition to rheumatoid and fibromyalgia with upper and lower back surgeries in her history presents to the hospital today with complaints of a headache  She was noted to have a right resting facial droop apparently at 10:00 a m  This morning  She later presented to the ED at 5:00 a m  This afternoon and she was noted to have markedly elevated blood pressures  Her blood pressure 1 point was 754 systolic  She has had a CT and a CTA consistent with that of a stroke workup and they are both clear  She will be admitted overnight for a stroke workup  NIH Stroke Scale Score:  1 a  Level of consciousness (alert, drowsy, coma)  0   1 b  LOC Question (month, age) Both, 1 correct, neither 0   1 c  LOC commands (eyes; fist) Both, 1 correct, neither 0   2  Best gaze (Tracking) Normal, Partial, gaze paresis 0  3  Visual field cuts- None, Partial mina, Complete mina, B Blind 0  4  Facial palsy (teeth, brows and lids shut) Normal, Minor, Partial, Complete 1   5 a  Motor Arm Left  (Elevate and score 10 sec drift) None, Drift (<10 sec), Some effort (falls < 10 sec),  No effort, No movement, Unable to score 0   5 b  Motor Arm Right (Elevate and score 10 sec drift) None, Drift (<10 sec), Some effort (falls < 10 sec),  No effort, No movement, Unable to score 1   6 a  Motor Leg Left (Elevate 30 hold 5 count) None, Drift (<5 sec), Some effort (falls < 5 sec),  No effort, No movement, Unable to score 0   6 b  Motor Leg Right (Elevate 30 hold 5 count) None, Drift (<5 sec), Some effort (falls < 5 sec),  No effort, No movement, Unable to score 1   7  Limb Ataxia (FNF HTS) Absent, Present one limb, Present two limbs, 0  8  Sensory (PP face, arm, trunk, leg) Normal, Mild/mod, Severe/total 1   9  Best Language (items, picture, reads) Good, Mild/mod, Severe, Mute 0   10  Dysarthria (speech clarity) Normal, Mild/mod, Severe dysarthria 0   11  Extinction/neglect None, Partial neglect, Complete 0    Total NIHSS 4   Mental Status: The patient was awake, alert, attentive, oriented to person, place, and time  Recent and remote memory intact to conversation with no evidence of language dysfunction  Satisfactory fund of knowledge  Normal attention span and concentration  Able to name, repeat, describe a complex scene  This patient will need outpatient neurology follow-up  She can be seen in any of our offices however she lives close to the [facility] hearing could be seen in our [place] office location by any of our providers  HPI:  Bryon Zavaleta is a 64 y o  female with PMH obtain from her [facility] chart  She was most recently August 5th seen in a liver/GI specialists office  The following is taken from their note  To summarize  HTN, hyperlipidemia, DMT2, GERD, Rheumatoid Arthritis, fibromyalgia, and obesity s/p cholecystectomy in 2015 who presents today (08/05/2021) for elevated liver enzymes   Patient with previous exposure to hepatitis B core + on Humira for treatment for RA  sAg negative last DNA checked 2019 negative  Never seen by liver for full evaluation, referred after recent ED visit for abdominal pain (treated w/ cipro 500 mg 3 days for UTI with resolution of abdominal pain) and noted to have significantly elevated LFT's from baseline  Also noted to have alk phos and GGT elevations however no elevations in bilirubin  It is in the background of this medical history that the patient presents here to our facility today after she noticed sometime around 10:00 a m  This morning that she thought her right face looked lazy  She reports she has had a headache for 3 days  She reports that she has not been able to shake this headache  When she presented to the emergency room approximately a quarter after 5 her blood pressure was noted to be quite elevated at 800 systolic  Stroke alert was called  Her NIH is score for however her point system for her arm her leg and her sensory deficit largely appears to be related to her back previously were she has had surgery  She is able to report her history and participate in exam at this time  ROS: 12 system cued query:  She reports significant complaints of headache and she indicates her whole head region  She reports the headache has now been present for over 3 days  She has tried to take Tylenol for but has not always been effective  She denies any visual problems  She reports she has not had a stroke before when the queried concerning the right cheek weakness  She reports to that she feels foggy today  When told her blood pressure was markedly elevated she reports that she does not check her blood pressure at home  Furthermore she reports that she has not been using her blood pressure meds for the last several days  Apparently she was somewhat confused concerning the directions from her GI physician in [place] concerning her elevated liver enzymes    She reports that she always walks with difficulty on the right side because of her back surgery  She reports also that she frequently has some weakness in the right foot also because of her back surgery and she also reports that she frequently has some numbness again related to her back surgery she is Um not a precise historian as to where her dysesthesias have been present before  She also reports a long history of fibromyalgia as well as RA  She denies chest pain or shortness of breath at this time  No recent falls  Historical Information     Past Medical History:   Diagnosis Date    Hypertension       OA (osteoarthritis)    Vitamin D deficiency    Lumbar facet arthropathy    Displacement of lumbar intervertebral disc without myelopathy    Thoracic or lumbosacral neuritis or radiculitis, unspecified    Osteoporosis    Cervical disc herniation    Dyslipidemia    GERD (gastroesophageal reflux disease)    Essential hypertension    Carpal tunnel syndrome    Obesity (BMI 30 0-34  9)    Type 2 diabetes mellitus, with long-term current use of insulin    Corneal arcus senilis    Blepharitis    Refractive error    Pinguecula    RA (rheumatoid arthritis)    Dry eye syndrome    Cervical stenosis of spinal canal    S/P C4-T1 PSF and R foraminotomy    Lumbar spinal stenosis    S/P lumbar spinal fusion    Cholelithiasis    Fibromyalgia    Trochanteric bursitis of right hip    Cervical radiculopathy    Chronic pain following surgery or procedure    Postlaminectomy syndrome    Lumbar radiculopathy    Encounter for long-term opiate analgesic use    Myofascial pain    Chronic pain syndrome    Depression    Smoking history    Gastroesophageal reflux disease       Past Surgical History:   Procedure Laterality Date    BACK SURGERY         Social History  reports that she has been smoking cigarettes  She has been smoking about 0 25 packs per day   She has never used smokeless tobacco  She reports current alcohol use of about 2 0 - 3 0 standard drinks of alcohol per week  Family History: History reviewed  No pertinent family history  Allergies   Allergen Reactions    Ciprofloxacin Other (See Comments)     Liver damage    Penicillins Rash     Meds:all current active meds have been reviewed her med list from August 5th is as follows  Bis Subcit Pot-Tetra-Metronid (PYLERA) 140-125-125 mg capsule Take 3 capsules by mouth 4 times a day before meals & nightly   omeprazole (PRILOSEC) 40 mg capsule Take 1 capsule by mouth 2 times a day for 14 days   famotidine (PEPCID) 40 mg tablet Take 1 tablet by mouth at bedtime   omeprazole (PRILOSEC) 40 mg capsule Take 1 capsule by mouth once daily   amitriptyline (ELAVIL) 50 mg tablet Take 1 tablet by mouth at bedtime   QUEtiapine (SEROQUEL) 100 mg tablet Take 1 tablet by mouth at bedtime   venlafaxine XR (EFFEXOR XR) 150 mg capsule,extended release 24hr Take 1 capsule by mouth once daily   venlafaxine XR (EFFEXOR XR) 37 5 mg capsule,extended release 24hr Take 1 capsule by mouth once daily   insulin aspart U-100 (NOVOLOG FLEXPEN U-100 INSULIN) 100 unit/mL (3 mL) insulin pen Inject 10-20 units before each meal    Insulin Detemir (LEVEMIR FLEXTOUCH U-100 INSULN) 100 unit/mL (3 mL) insulin pen Inject 20-30 units daily based on MD instructions    Insulin Needles, Disposable, (MACIEJ PEN NEEDLE) 32 gauge x 5/32" needle Use as directed 4 times per day    adalimumab (HUMIRA,CF, PEN) 40 mg/0 4 mL pen injector kit Inject 0 4 mLs subcutaneously once a week   amLODIPine (NORVASC) 10 mg tablet Take 1 tablet by mouth once daily   atorvastatin (LIPITOR) 40 mg tablet Take 1 tablet by mouth once daily   dulaglutide (TRULICITY) 1 5 MR/5 5 mL pen injector Inject 0 5 mLs subcutaneously once a week   gabapentin (NEURONTIN) 600 mg tablet Take 2 tablets by mouth 3 times a day   For 3 month supply    glucose blood VI test strips (ACCU-CHEK GUIDE TEST STRIPS) as directed 4 times per day    hydroCHLOROthiazide 12 5 mg tablet Take 1 tablet by mouth once daily  Indications: high blood pressure    metFORMIN (GLUCOPHAGE) 1,000 mg tablet Take 1 tablet by mouth 2 times a day   milnacipran (SAVELLA) 100 mg tablet tablet Take 1 tablet by mouth 2 times a day   valsartan (DIOVAN) 320 mg tablet Take 1 tablet by mouth once daily   leflunomide (ARAVA) 20 mg tablet Take 1 tablet by mouth once daily   meclizine (ANTIVERT) 25 mg tablet tablet Take 1 tablet by mouth 3 times daily as needed for Dizziness or Nausea   Blood-Glucose Meter (ACCU-CHEK GUIDE GLUCOSE METER) misc Use as directed daily    Lancets (ACCU-CHEK FASTCLIX LANCET DRUM) misc as directed 4-6 times per day    Alcohol Swabs (ALCOHOL PREP PADS) as directed 6 times per day    lancets (ONE TOUCH DELICA) 33 gauge misc Use 1 Device 6 times a day  Scheduled Meds:  PRN Meds:       Physical Exam:   Objective   Vitals:Blood pressure (!) 184/82, pulse 64, temperature 97 9 °F (36 6 °C), temperature source Oral, resp  rate 18, weight 80 1 kg (176 lb 9 4 oz), SpO2 98 %, not currently breastfeeding  ,There is no height or weight on file to calculate BMI  Patient was examined in emergency department bed she is awake alert and cooperative  General: alert, appears stated age  Head: Normocephalic, without obvious abnormality, atraumatic  Oral exam: lips, mucosa, and tongue moist; no tongue bite  Neck: no carotid bruit,   Lungs: clear to auscultation ant  bilaterally  Heart: regular rate and rhythm, S1, S2 normal, no murmur appreciated,   Abdomen: soft, +BS    Extremities: atraumatic, no cyanosis or edema    Neurologic:   Mental status: Alert, oriented, thought content appropriate, her speech is fluent without aphasia or dysarthria  She is able to repeat complex phrases  She is able to follow 2 and 3 part commands although her English is her 2nd language    CN Exam: JAYDON, EOM's I, VF full, Gaze conjugate, she has a decreased pinprick discrimination as well as light touch and cold on the right side of her face division 2 and 3 compared to the left  No acute volitional motor lateralizations however she does have a resting relaxation of the right nasolabial fold  (No PP on face), Hearing I B, CNIX-XII I B, tongue is midline palate rises symmetrically in uvula was poorly visualized  Shoulder shrug is intact  Motor: full power, age appropriate x left limbs, on her right she has give-way weakness on both the right upper and right lower extremity  With great effort and cuing she is able to give maximal effort and get a 4+ hand  on the right compared to the 5 on the left  She reports this is which she thinks she normally is because of her neck surgery  She has a footdrop also somewhat although it is not complete on the right she reports this is also since her back  Sensory: intact  X 4 limbs, 4 mod inc lt, temp, vib which persists for only of 2nd or so transiently at the great toe  PP testing is diminished more so on the right than the left  As was cold and light touch  Cerebellar:  No gross cerebellar dysfunction  DTR's: Age appropriate, WNL; Plantars:  Mute bilaterally  Gait:  She reports she walks slowly because of her previous back surgery           Lab Results:   I have personally reviewed pertinent reports    , CBC:   Results from last 7 days   Lab Units 08/20/21  1751   WBC Thousand/uL 8 94   RBC Million/uL 4 71   HEMOGLOBIN g/dL 13 5   HEMATOCRIT % 42 6   MCV fL 90   PLATELETS Thousands/uL 301   , BMP/CMP:   Results from last 7 days   Lab Units 08/20/21  1751   SODIUM mmol/L 139   POTASSIUM mmol/L 4 3   CHLORIDE mmol/L 105   CO2 mmol/L 26   BUN mg/dL 16   CREATININE mg/dL 1 07   CALCIUM mg/dL 8 5   EGFR ml/min/1 73sq m 58   , Vitamin B12:   , HgBA1C:   , TSH:   , Coagulation:   Results from last 7 days   Lab Units 08/20/21  1751   INR  0 89   , Lipid Profile:        Imaging Studies: I have personally reviewed pertinent films in PACS and She is noted to have Um a minimal degree of small vessel disease although I thought she may have a small lacunar infarct in the lower left subcortical anterior region  Her CTA has no acute occlusions stenosis and aneurysm or large vessel occlusions noted  EEG, Echo, Pathology, and Other Studies: She will have an echocardiogram as part of her workup  Counseling / Coordination of Care  Total Critical Care time spent Greater than 50 minutes excluding procedures, teaching and family updates  Dictation voice to text software has been used in the creation of this document  Please consider this in light of any contextual or grammatical errors

## 2021-08-21 VITALS
WEIGHT: 176.59 LBS | TEMPERATURE: 98.1 F | HEART RATE: 55 BPM | OXYGEN SATURATION: 98 % | SYSTOLIC BLOOD PRESSURE: 146 MMHG | DIASTOLIC BLOOD PRESSURE: 71 MMHG | RESPIRATION RATE: 19 BRPM

## 2021-08-21 PROBLEM — I67.4 HYPERTENSIVE ENCEPHALOPATHY: Status: ACTIVE | Noted: 2021-08-20

## 2021-08-21 LAB
ANION GAP SERPL CALCULATED.3IONS-SCNC: 10 MMOL/L (ref 4–13)
ATRIAL RATE: 51 BPM
ATRIAL RATE: 66 BPM
BASOPHILS # BLD AUTO: 0.05 THOUSANDS/ΜL (ref 0–0.1)
BASOPHILS NFR BLD AUTO: 1 % (ref 0–1)
BUN SERPL-MCNC: 12 MG/DL (ref 5–25)
CALCIUM SERPL-MCNC: 8.1 MG/DL (ref 8.3–10.1)
CHLORIDE SERPL-SCNC: 104 MMOL/L (ref 100–108)
CO2 SERPL-SCNC: 26 MMOL/L (ref 21–32)
CREAT SERPL-MCNC: 0.83 MG/DL (ref 0.6–1.3)
EOSINOPHIL # BLD AUTO: 0.14 THOUSAND/ΜL (ref 0–0.61)
EOSINOPHIL NFR BLD AUTO: 2 % (ref 0–6)
ERYTHROCYTE [DISTWIDTH] IN BLOOD BY AUTOMATED COUNT: 13.8 % (ref 11.6–15.1)
GFR SERPL CREATININE-BSD FRML MDRD: 79 ML/MIN/1.73SQ M
GLUCOSE SERPL-MCNC: 140 MG/DL (ref 65–140)
GLUCOSE SERPL-MCNC: 142 MG/DL (ref 65–140)
GLUCOSE SERPL-MCNC: 157 MG/DL (ref 65–140)
GLUCOSE SERPL-MCNC: 157 MG/DL (ref 65–140)
GLUCOSE SERPL-MCNC: 171 MG/DL (ref 65–140)
HCT VFR BLD AUTO: 40.5 % (ref 34.8–46.1)
HGB BLD-MCNC: 12.8 G/DL (ref 11.5–15.4)
IMM GRANULOCYTES # BLD AUTO: 0.04 THOUSAND/UL (ref 0–0.2)
IMM GRANULOCYTES NFR BLD AUTO: 1 % (ref 0–2)
LYMPHOCYTES # BLD AUTO: 3.12 THOUSANDS/ΜL (ref 0.6–4.47)
LYMPHOCYTES NFR BLD AUTO: 41 % (ref 14–44)
MCH RBC QN AUTO: 28.5 PG (ref 26.8–34.3)
MCHC RBC AUTO-ENTMCNC: 31.6 G/DL (ref 31.4–37.4)
MCV RBC AUTO: 90 FL (ref 82–98)
MONOCYTES # BLD AUTO: 0.51 THOUSAND/ΜL (ref 0.17–1.22)
MONOCYTES NFR BLD AUTO: 7 % (ref 4–12)
NEUTROPHILS # BLD AUTO: 3.69 THOUSANDS/ΜL (ref 1.85–7.62)
NEUTS SEG NFR BLD AUTO: 48 % (ref 43–75)
NRBC BLD AUTO-RTO: 0 /100 WBCS
P AXIS: 51 DEGREES
P AXIS: 57 DEGREES
PLATELET # BLD AUTO: 270 THOUSANDS/UL (ref 149–390)
PMV BLD AUTO: 10.5 FL (ref 8.9–12.7)
POTASSIUM SERPL-SCNC: 3.9 MMOL/L (ref 3.5–5.3)
PR INTERVAL: 138 MS
PR INTERVAL: 144 MS
QRS AXIS: 51 DEGREES
QRS AXIS: 57 DEGREES
QRSD INTERVAL: 74 MS
QRSD INTERVAL: 80 MS
QT INTERVAL: 394 MS
QT INTERVAL: 460 MS
QTC INTERVAL: 413 MS
QTC INTERVAL: 423 MS
RBC # BLD AUTO: 4.49 MILLION/UL (ref 3.81–5.12)
SODIUM SERPL-SCNC: 140 MMOL/L (ref 136–145)
T WAVE AXIS: 25 DEGREES
T WAVE AXIS: 37 DEGREES
VENTRICULAR RATE: 51 BPM
VENTRICULAR RATE: 66 BPM
WBC # BLD AUTO: 7.55 THOUSAND/UL (ref 4.31–10.16)

## 2021-08-21 PROCEDURE — 82948 REAGENT STRIP/BLOOD GLUCOSE: CPT

## 2021-08-21 PROCEDURE — 99213 OFFICE O/P EST LOW 20 MIN: CPT | Performed by: NURSE PRACTITIONER

## 2021-08-21 PROCEDURE — 99217 PR OBSERVATION CARE DISCHARGE MANAGEMENT: CPT | Performed by: INTERNAL MEDICINE

## 2021-08-21 PROCEDURE — 85025 COMPLETE CBC W/AUTO DIFF WBC: CPT

## 2021-08-21 PROCEDURE — 93010 ELECTROCARDIOGRAM REPORT: CPT | Performed by: INTERNAL MEDICINE

## 2021-08-21 PROCEDURE — 36415 COLL VENOUS BLD VENIPUNCTURE: CPT

## 2021-08-21 PROCEDURE — 97163 PT EVAL HIGH COMPLEX 45 MIN: CPT

## 2021-08-21 PROCEDURE — 80048 BASIC METABOLIC PNL TOTAL CA: CPT

## 2021-08-21 RX ORDER — ATORVASTATIN CALCIUM 40 MG/1
40 TABLET, FILM COATED ORAL EVERY EVENING
Qty: 30 TABLET | Refills: 0 | Status: SHIPPED | OUTPATIENT
Start: 2021-08-21 | End: 2021-09-20

## 2021-08-21 RX ORDER — AMLODIPINE BESYLATE 5 MG/1
10 TABLET ORAL DAILY
Status: DISCONTINUED | OUTPATIENT
Start: 2021-08-21 | End: 2021-08-21 | Stop reason: HOSPADM

## 2021-08-21 RX ORDER — LOSARTAN POTASSIUM 50 MG/1
100 TABLET ORAL DAILY
Status: DISCONTINUED | OUTPATIENT
Start: 2021-08-21 | End: 2021-08-21 | Stop reason: HOSPADM

## 2021-08-21 RX ORDER — HYDROCHLOROTHIAZIDE 12.5 MG/1
12.5 TABLET ORAL DAILY
Qty: 30 TABLET | Refills: 0 | Status: SHIPPED | OUTPATIENT
Start: 2021-08-22 | End: 2021-09-21

## 2021-08-21 RX ORDER — ASPIRIN 81 MG/1
81 TABLET ORAL DAILY
Qty: 30 TABLET | Refills: 0 | Status: SHIPPED | OUTPATIENT
Start: 2021-08-22 | End: 2021-09-21

## 2021-08-21 RX ORDER — ACETAMINOPHEN 325 MG/1
650 TABLET ORAL EVERY 6 HOURS PRN
Refills: 0
Start: 2021-08-21 | End: 2021-08-26

## 2021-08-21 RX ORDER — AMLODIPINE BESYLATE 10 MG/1
10 TABLET ORAL DAILY
Qty: 30 TABLET | Refills: 0 | Status: SHIPPED | OUTPATIENT
Start: 2021-08-22 | End: 2021-09-21

## 2021-08-21 RX ORDER — ACETAMINOPHEN 325 MG/1
650 TABLET ORAL EVERY 6 HOURS PRN
Status: DISCONTINUED | OUTPATIENT
Start: 2021-08-21 | End: 2021-08-21 | Stop reason: HOSPADM

## 2021-08-21 RX ORDER — HYDROCHLOROTHIAZIDE 12.5 MG/1
12.5 TABLET ORAL DAILY
Status: DISCONTINUED | OUTPATIENT
Start: 2021-08-21 | End: 2021-08-21 | Stop reason: HOSPADM

## 2021-08-21 RX ADMIN — FAMOTIDINE 40 MG: 20 TABLET ORAL at 10:58

## 2021-08-21 RX ADMIN — HEPARIN SODIUM 5000 UNITS: 5000 INJECTION INTRAVENOUS; SUBCUTANEOUS at 10:59

## 2021-08-21 RX ADMIN — GABAPENTIN 600 MG: 300 CAPSULE ORAL at 17:02

## 2021-08-21 RX ADMIN — PANTOPRAZOLE SODIUM 40 MG: 40 TABLET, DELAYED RELEASE ORAL at 10:58

## 2021-08-21 RX ADMIN — AMITRIPTYLINE HYDROCHLORIDE 50 MG: 25 TABLET, FILM COATED ORAL at 13:44

## 2021-08-21 RX ADMIN — AMLODIPINE BESYLATE 10 MG: 5 TABLET ORAL at 13:43

## 2021-08-21 RX ADMIN — LOSARTAN POTASSIUM 100 MG: 50 TABLET, FILM COATED ORAL at 13:44

## 2021-08-21 RX ADMIN — HYDROCHLOROTHIAZIDE 12.5 MG: 12.5 TABLET ORAL at 16:27

## 2021-08-21 RX ADMIN — INSULIN LISPRO 1 UNITS: 100 INJECTION, SOLUTION INTRAVENOUS; SUBCUTANEOUS at 01:14

## 2021-08-21 RX ADMIN — INSULIN DETEMIR 20 UNITS: 100 INJECTION, SOLUTION SUBCUTANEOUS at 11:40

## 2021-08-21 RX ADMIN — ACETAMINOPHEN 650 MG: 325 TABLET, FILM COATED ORAL at 13:43

## 2021-08-21 RX ADMIN — HEPARIN SODIUM 5000 UNITS: 5000 INJECTION INTRAVENOUS; SUBCUTANEOUS at 16:23

## 2021-08-21 RX ADMIN — GABAPENTIN 600 MG: 300 CAPSULE ORAL at 10:58

## 2021-08-21 RX ADMIN — ATORVASTATIN CALCIUM 40 MG: 40 TABLET, FILM COATED ORAL at 16:28

## 2021-08-21 RX ADMIN — ASPIRIN 81 MG: 81 TABLET, COATED ORAL at 10:59

## 2021-08-21 NOTE — ASSESSMENT & PLAN NOTE
65 yo female with HTN, HLD, DM type 2, RA and fibromyalgia, and h/o upper and lower back surgeries in who presented to ED on 8/20/21 with headache  She was noted to have a right resting facial droop  /99 on arrival to ED         · CT head:No acute intracranial abnormality  · CTA head and neck: No evidence of hemodynamic significant stenosis, aneurysm or dissection  · MRI Brain normal    Etiology suspect hypertensive encephalopathy    Plan:  - recommend BP control with normotensive BP goal, management per Primary team  - continue statin, , HDL 48, triglycerides 140  - medical management per Primary team  - PT/OT

## 2021-08-21 NOTE — ASSESSMENT & PLAN NOTE
No results for input(s): POCGLU in the last 72 hours        · A1C 13 6 on 8/5  · Glucose 162 on admission  · Continue home levemir   · SSI insulin ordered   · Nutrition services consulted

## 2021-08-21 NOTE — PLAN OF CARE
Problem: PHYSICAL THERAPY ADULT  Goal: Performs mobility at highest level of function for planned discharge setting  See evaluation for individualized goals  Description: Treatment/Interventions: Functional transfer training, LE strengthening/ROM, Elevations, Therapeutic exercise, Endurance training, Bed mobility, Gait training, Spoke to nursing          See flowsheet documentation for full assessment, interventions and recommendations  Outcome: Progressing  Note: Prognosis: Good  Problem List: Decreased strength, Decreased endurance, Impaired balance, Decreased mobility, Impaired judgement, Decreased safety awareness  Assessment: Pt is 64 y o  female seen for PT evaluation s/p admit to [facility] on 8/20/2021 w/ Hypertensive encephalopathy  PT consulted to assess pt's functional mobility and d/c needs  Order placed for PT eval and tx, w/ up and OOB as tolerated order  Comorbidities affecting pt's physical performance at time of assessment include: stroke-like symptoms, type 2 diabetes mellitus and HTN  PTA, pt was independent w/ all functional mobility w/ no assistive device, ambulates community distances and elevations, ambulates household distances, lives w/  in two level house and retired  Personal factors affecting pt at time of IE include: lives in 2 story house, inability to navigate community distances and unable to perform dynamic tasks in community  Please find objective findings from PT assessment regarding body systems outlined above with impairments and limitations including weakness, impaired balance, decreased endurance, gait deviations, pain, decreased functional mobility tolerance and fall risk  The following objective measures performed on IE also reveal limitations: Barthel Index: 60/100, Modified [place]: 3 (moderate disability) and AM-PAC 6-Clicks: 14/35   Pt's clinical presentation is currently unstable/unpredictable seen in pt's presentation of continued need for medical management and monitoring, increased pain and impaired strength and balance increasing risk for falls  Pt to benefit from continued PT tx to address deficits as defined above and maximize level of functional independent mobility and consistency  From PT/mobility standpoint, recommendation at time of d/c would be home with home health rehabilitation pending progress in order to facilitate return to PLOF  Barriers to Discharge: Inaccessible home environment        PT Discharge Recommendation: Home with home health rehabilitation     PT - OK to Discharge: Yes    See flowsheet documentation for full assessment

## 2021-08-21 NOTE — ASSESSMENT & PLAN NOTE
Recent Labs     08/21/21  0037 08/21/21  0850 08/21/21  1139 08/21/21  1623   POCGLU 157* 142* 140 171*       (P) 152 5  · Resume home medication

## 2021-08-21 NOTE — ED NOTES
Pt back from MRI  Pt in no distress  Call bell in reach   Pt on cardiac monitor     Ivette Olivas, RN  08/20/21 7913

## 2021-08-21 NOTE — UTILIZATION REVIEW
Initial Clinical Review    Admission: Date/Time/Statement:   Admission Orders (From admission, onward)     Ordered        08/20/21 1901  Place in Observation  Once                   Orders Placed This Encounter   Procedures    Place in Observation     Standing Status:   Standing     Number of Occurrences:   1     Order Specific Question:   Level of Care     Answer:   Med Surg [16]     ED Arrival Information     Expected Arrival Acuity    - 8/20/2021 14:47 Urgent         Means of arrival Escorted by Service Admission type    Walk-In Self Hospitalist Urgent         Arrival complaint    headache        Chief Complaint   Patient presents with    Headache     headache for 3 days, no relief with tylenol q6hr  +nausea       Initial Presentation:  64 y o  female with a PMH of type II DM, hypertension, hyperlipidemia,GERD, RA, fibromyalgia, and obesity who presents with right sided facial droop and right upper and lower extremity weakness  reports not taking home medications for the past 3 days per the recommendations of a liver specialist at [facility]  BP in ED was 212/99 given labetalol, /82  Admitted OBS status plan for MRI , echo , lipid panel , speech eval  , neuro checks , PT OT eval , neuro consult   DM SSI and monitor   HTN keep BP elevated d/t recent TUA hydralazine if SBP > 200  GERD PPI   PE : Mild right nasolabial droop, right upper and lower extremity weakness    8/20 Neuro consult   Pt not a tpa candidate  CT and a CTA consistent with that of a stroke workup and they are both clear  for her arm her leg and her sensory deficit largely appears to be related to her back previously were she has had surgery      ED Triage Vitals   Temperature Pulse Respirations Blood Pressure SpO2   08/20/21 1511 08/20/21 1511 08/20/21 1511 08/20/21 1511 08/20/21 1511   97 9 °F (36 6 °C) 65 15 (!) 212/99 99 %      Temp Source Heart Rate Source Patient Position - Orthostatic VS BP Location FiO2 (%)   08/20/21 1511 08/20/21 1511 08/20/21 1724 08/20/21 1511 --   Oral Monitor Sitting Left arm       Pain Score       08/20/21 1511       7          Wt Readings from Last 1 Encounters:   08/20/21 80 1 kg (176 lb 9 4 oz)     Additional Vital Signs:   08/21/21 0530  --  55  16  136/73  99  99 %  None (Room air)  Lying   08/21/21 0500  --  50Abnormal   16  159/72  104  98 %  None (Room air)  --   08/21/21 0430  --  59  20  172/79Abnormal   112  98 %  None (Room air)  Lying   08/21/21 0038  --  53Abnormal   20  171/81Abnormal   --  98 %  None (Room air)  Lying   08/20/21 2230  --  51Abnormal   16  160/75  108  99 %  None (Room air)  Sitting   08/20/21 2143  --  50Abnormal   18  173/79Abnormal   --  99 %  None (Room air)  Sitting   08/20/21 2030  --  50Abnormal   20  168/74  106  98 %  None (Room air)  Lying   08/20/21 2000  --  50Abnormal   20  181/78Abnormal   112  99 %  None (Room air)  Lying   08/20/21 1930  --  51Abnormal   22  176/81Abnormal   117  97 %  None (Room air)  Lying   08/20/21 1900  --  64  18  184/82Abnormal   118  98 %  None (Room air)  Sitting   08/20/21 18:42:15  --  62  18  174/88Abnormal   --  98 %  None (Room air)  --   08/20/21 18:27:12  --  68  20  187/97Abnormal   --  99 %  None (Room air)  --   08/20/21 18:12:18  --  74  18  211/102Abnormal   --  99 %  None (Room air)  --   08/20/21 17:57:24  --  80  20  209/100Abnormal   --  99 %  None (Room air)  --   08/20/21 17:42:47  --  72  18  202/90Abnormal   --  99 %  None (Room air)  --   08/20/21 1724  --  77  18  201/98Abnormal   --  99 %  None (Room air         Pertinent Labs/Diagnostic Test Results:   8/20 MRI brain   8/20 PCXR   8/20 CTA head/neck No evidence of hemodynamic significant stenosis, aneurysm or dissection  8/20 CT brain No acute intracranial abnormality  8/20 Echo   Results from last 7 days   Lab Units 08/20/21  1812   SARS-COV-2  Negative     Results from last 7 days   Lab Units 08/21/21  0507 08/20/21  1751   WBC Thousand/uL 7 55 8 94   HEMOGLOBIN Daily  gabapentin, 600 mg, Oral, BID  heparin (porcine), 5,000 Units, Subcutaneous, Q8H [address]  insulin detemir, 20 Units, Subcutaneous, QAM  insulin lispro, 1-6 Units, Subcutaneous, TID AC  insulin lispro, 1-6 Units, Subcutaneous, HS  pantoprazole, 40 mg, Oral, Early Morning  QUEtiapine, 100 mg, Oral, Daily  venlafaxine, 150 mg, Oral, Daily  venlafaxine, 37 5 mg, Oral, Daily      Continuous IV Infusions:     PRN Meds:     Neuro checks   Fingerstick ac and hs    IP CONSULT TO NEUROLOGY  IP CONSULT TO NUTRITION SERVICES  IP CONSULT TO PHYSICAL MEDICINE REHAB    Network Utilization Review Department  ATTENTION: Please call with any questions or concerns to [phone] and carefully listen to the prompts so that you are directed to the right person  All voicemails are confidential   Gary Gutierrez all requests for admission clinical reviews, approved or denied determinations and any other requests to dedicated fax number below belonging to the campus where the patient is receiving treatment   List of dedicated fax numbers for the Facilities:  [address] DENIALS (Administrative/Medical Necessity) [phone]   [address] (Maternity/NICU/Pediatrics) [phone]   [address] Lizbeth Rodriguez [phone] [place] [address] Jonn Toscano [address] [facility] [facility] [phone]

## 2021-08-21 NOTE — ASSESSMENT & PLAN NOTE
· Patient reports not taking home medications for the past 3 days per the recommendations of a liver specialist at [facility]  Also reports constant headache for past three days  Today at 10am the patient noticed a right-sided facial droop  ED arrival time 5  · Glucose 162  · Bp 212/99 on presentation; labetolol given in ED latest /82  · Troponin, CBC, and BMP WNL  · COVID negative  · Head CT and CTA showed no acute ischemia  · Aspirin administered in the ED  · Patient continues to have mild right-sided nasolabial fold relaxation and upper and lower right extremity weakness on physical exam  Patient reports right upper/lower extremity weakness is chronic and due to existing back problems  With CT of head showing no acute ischemia TIA is likely  · MRI ordered  · Echo ordered  · Lipid panel ordered  · Speech evaluation ordered; holding diet pending speech evaluation    · Continue neuro checks  · Nutrition services consulted   · Physical medicine consulted  · PT/OT eval ordered  · Neurology consulted; their input is greatly appreciated

## 2021-08-21 NOTE — ASSESSMENT & PLAN NOTE
· Hypertensive urgency on admission  · Restarted home medication Norvasc 10 mg daily, Diovan and HCTZ  · Blood pressure better now    Last blood pressure 142/70 mmhg  · Recommended to follow blood pressure at home and follow-up with PCP

## 2021-08-21 NOTE — PROGRESS NOTES
[facility]  Progress Note - Richard Price 1965, 64 y o  female MRN: [id number]  Unit/Bed#: ED 06 Encounter: [id number]  Primary Care Provider: No primary care provider on file  Date and time admitted to hospital: 8/20/2021  5:19 PM    * Hypertensive encephalopathy  Assessment & Plan  63 yo female with HTN, HLD, DM type 2, RA and fibromyalgia, and h/o upper and lower back surgeries in who presented to ED on 8/20/21 with headache  She was noted to have a right resting facial droop  /99 on arrival to ED  · CT head:No acute intracranial abnormality  · CTA head and neck: No evidence of hemodynamic significant stenosis, aneurysm or dissection  · MRI Brain normal    Etiology suspect hypertensive encephalopathy    Plan:  - recommend BP control with normotensive BP goal, management per Primary team  - continue statin, , HDL 48, triglycerides 140  - medical management per Primary team  - PT/OT      Richard Price will not need outpatient follow up with Neurology  She will not require outpatient neurological testing  Subjective:   Per patient, the day before yesterday she had headache in the occipital region with radiation to bifrontal region accompanied by diplopia, blurry vision  At baseline patient reports she has chronic right arm and leg weakness as well as sensory deficits related to back surgeries in the past    Review of Systems    Vitals: Blood pressure (!) 174/84, pulse 56, temperature 98 1 °F (36 7 °C), temperature source Oral, resp  rate 18, weight 80 1 kg (176 lb 9 4 oz), SpO2 99 %, not currently breastfeeding  ,There is no height or weight on file to calculate BMI      MEDS:  Current Facility-Administered Medications   Medication Dose Route Frequency Provider Last Rate    amitriptyline  50 mg Oral Daily [place], [place]      aspirin  81 mg Oral Daily Arabella Coffee [place], [place]      atorvastatin  40 mg Oral QPM [place]     Verónica Silence famotidine  40 mg Oral Daily [place], [place]      gabapentin  600 mg Oral BID [place], [place]      heparin (porcine)  5,000 Units Subcutaneous [place], [place]      insulin detemir  20 Units Subcutaneous QAM [place], [place]      insulin lispro  1-6 Units Subcutaneous TID [facility] [place], [place]      insulin lispro  1-6 Units Subcutaneous HS [place], [place]      pantoprazole  40 mg Oral Early Morning [place], [place]      QUEtiapine  100 mg Oral Daily [place], [place]      venlafaxine  150 mg Oral Daily [place], [place]      venlafaxine  37 5 mg Oral Daily [place], [place]     :      Physical Exam  Constitutional:       Appearance: Normal appearance  HENT:      Head: Normocephalic and atraumatic  Mouth/Throat:      Mouth: Mucous membranes are moist    Eyes:      Extraocular Movements: Extraocular movements intact  Pupils: Pupils are equal, round, and reactive to light  Neck:      Vascular: No carotid bruit  Cardiovascular:      Rate and Rhythm: Regular rhythm  Bradycardia present  Pulmonary:      Effort: Pulmonary effort is normal  No respiratory distress  Breath sounds: Normal breath sounds  Abdominal:      General: Bowel sounds are normal  There is no distension  Palpations: Abdomen is soft  Musculoskeletal:         General: No swelling or tenderness  Cervical back: Normal range of motion  Tenderness: mild thoracic pain into scapula on right  Skin:     General: Skin is warm and dry  Neurological:      Mental Status: She is alert and oriented to person, place, and time  Deep Tendon Reflexes:      Reflex Scores:       Tricep reflexes are 2+ on the right side and 2+ on the left side  Bicep reflexes are 2+ on the right side and 2+ on the left side  Patellar reflexes are 1+ on the right side and 2+ on the left side    Psychiatric:         Mood and Affect: Mood normal          Speech: Speech normal          Behavior: Behavior normal          Neurologic Exam     Mental Status   Oriented to person, place, and time  Attention: normal  Concentration: normal    Speech: speech is normal   Level of consciousness: alert    Cranial Nerves     CN II   Visual acuity: normal    CN III, IV, VI   Pupils are equal, round, and reactive to light  Right pupil: Size: 2 mm  Shape: regular  Left pupil: Size: 2 mm  Shape: regular  Trace flattening nasal labial fold, slight sensory deficit right side     Motor Exam   Muscle bulk: normal  Overall muscle tone: normal    Strength   Strength 5/5 except as noted  Chronic weakness due to radiculopathy cervical and lumbar surgeries in the past   RUE hand 4/5, triceps 5-, deltoid 5  RLE: proximal weakness 3+/5,  diffuse weakness quadraceps, hamistrings, dorsiflexor 3/5     Sensory Exam   Right hemisensory dysfunction, pinprick decrased on right compared to left     Gait, Coordination, and Reflexes     Reflexes   Right biceps: 2+  Left biceps: 2+  Right triceps: 2+  Left triceps: 2+  Right patellar: 1+  Left patellar: 2+  Right plantar: normal  Left plantar: normal      Lab Results:   I have personally reviewed pertinent reports    , CBC:   Results from last 7 days   Lab Units 08/21/21  0507 08/20/21  1751   WBC Thousand/uL 7 55 8 94   RBC Million/uL 4 49 4 71   HEMOGLOBIN g/dL 12 8 13 5   HEMATOCRIT % 40 5 42 6   MCV fL 90 90   PLATELETS Thousands/uL 270 301   , BMP/CMP:   Results from last 7 days   Lab Units 08/21/21  0506 08/20/21  1751   SODIUM mmol/L 140 139   POTASSIUM mmol/L 3 9 4 3   CHLORIDE mmol/L 104 105   CO2 mmol/L 26 26   BUN mg/dL 12 16   CREATININE mg/dL 0 83 1 07   CALCIUM mg/dL 8 1* 8 5   EGFR ml/min/1 73sq m 79 58    Coagulation:   Results from last 7 days   Lab Units 08/20/21  1751   INR  0 89   Lipid Profile:   Results from last 7 days   Lab Units 08/20/21  2243   HDL mg/dL 48   LDL CALC mg/dL 149*   TRIGLYCERIDES mg/dL 148     Imaging Studies: I have personally reviewed pertinent reports  and I have personally reviewed pertinent films in PACS     EEG, Pathology, and Other Studies: I have personally reviewed pertinent reports  and I have personally reviewed pertinent films in PACS        Counseling / Coordination of Care  Total time spent today 20 minutes  Greater than 50% of total time was spent with the patient and / or family counseling and / or coordination of care   A description of the counseling / coordination of care: coordination of care with primary team  Discussion of diagnostic results with patient at bedside

## 2021-08-21 NOTE — PHYSICAL THERAPY NOTE
Physical Therapy Evaluation     Patient's Name: Harshal Gongora    Admitting Diagnosis  Headache [R51 9]    Problem List  Patient Active Problem List   Diagnosis    Hypertensive encephalopathy    Fibromyalgia    Depression    Displacement of lumbar intervertebral disc without myelopathy    Dyslipidemia    Essential hypertension    Gastroesophageal reflux disease    Lumbar facet arthropathy    Cervical radiculopathy    Obesity (BMI 30 0-34  9)    RA (rheumatoid arthritis) (formerly [facility])    Osteoporosis    Smoking history    Type 2 diabetes mellitus, with long-term current use of insulin (formerly [facility])    Vitamin D deficiency       Past Medical History  Past Medical History:   Diagnosis Date    Hypertension     Type 2 diabetes mellitus ([address] )        Past Surgical History  Past Surgical History:   Procedure Laterality Date    BACK SURGERY          08/21/21 0936   PT Last Visit   PT Visit Date 08/21/21   Note Type   Note type Evaluation   Pain Assessment   Pain Assessment Tool 0-10   Pain Score 7   Pain Location/Orientation Location: Head   Pain Onset/Description Onset: Ongoing   Patient's Stated Pain Goal 2   Hospital Pain Intervention(s) Ambulation/increased activity;Repositioned   Home Living   Type of Home House   Home Layout Two level;Bed/bath upstairs;Stairs to enter with rails  ([address] [facility])   Bathroom Shower/Tub Tub/shower unit   Bathroom Toilet Standard   Bathroom Equipment Other (Comment)  (none per patient)   Bathroom Accessibility Accessible   Home Equipment Walker;Cane   Prior Function   Level of [place] Independent with ADLs and functional mobility   Lives With Spouse   Receives Help From Family   ADL Assistance Independent   IADLs Independent   Falls in the last 6 months 0   Vocational Retired   Restrictions/Precautions   Braces or Orthoses Other (Comment)  (none per patient)   Other Precautions Fall Risk;Pain   General   Family/Caregiver Present No   Cognition   Overall Cognitive Status [facility] Arousal/Participation Alert   Orientation Level Oriented X4   Memory Within functional limits   Following Commands Follows all commands and directions without difficulty   Comments Pt agreeable to participate in PT evaluation   RLE Assessment   RLE Assessment X   Strength RLE   RLE Overall Strength 3+/5   LLE Assessment   LLE Assessment WFL   Coordination   Movements are Fluid and Coordinated 1   Sensation WFL   Light Touch   RLE Light Touch Grossly intact   LLE Light Touch Grossly intact   Bed Mobility   Supine to Sit 7  Independent   Sit to Supine 7  Independent   Additional Comments Pt received supine in bed with HOB elevated   Transfers   Sit to Stand 5  Supervision   Additional items Assist x 1; Increased time required   Stand to Sit 5  Supervision   Additional items Assist x 1; Increased time required   Additional Comments without assistive device   Ambulation/Elevation   Gait pattern Short stride; Inconsistent adelina;Decreased R stance   Gait Assistance 5  Supervision   Additional items Assist x 1;Verbal cues   Assistive Device None   Distance 100'   Stair Management Assistance Not tested   Balance   Static Sitting Good   Dynamic Sitting Good   Static Standing Fair +   Dynamic Standing Fair   Ambulatory Fair   Activity Tolerance   Activity Tolerance Patient tolerated treatment well   Medical Staff Made Aware RN confirmed pt appropriate for PT evaluation and made aware of session outcomes  Following evaluation pt returned to bed with HOB elevated and needs met, call bell within reach   Nurse Made Aware RN confirmed pt appropriate for PT evaluation   Assessment   Prognosis Good   Problem List Decreased strength;Decreased endurance; Impaired balance;Decreased mobility; Impaired judgement;Decreased safety awareness   Assessment Pt is 64 y o  female seen for PT evaluation s/p admit to [facility] on 8/20/2021 w/ Hypertensive encephalopathy  PT consulted to assess pt's functional mobility and d/c needs   Order placed for PT eval and tx, w/ up and OOB as tolerated order  Comorbidities affecting pt's physical performance at time of assessment include: stroke-like symptoms, type 2 diabetes mellitus and HTN  PTA, pt was independent w/ all functional mobility w/ no assistive device, ambulates community distances and elevations, ambulates household distances, lives w/  in two level house and retired  Personal factors affecting pt at time of IE include: lives in 2 story house, inability to navigate community distances and unable to perform dynamic tasks in community  Please find objective findings from PT assessment regarding body systems outlined above with impairments and limitations including weakness, impaired balance, decreased endurance, gait deviations, pain, decreased functional mobility tolerance and fall risk  The following objective measures performed on IE also reveal limitations: Barthel Index: 60/100, Modified [place]: 3 (moderate disability) and AM-PAC 6-Clicks: 28/45  Pt's clinical presentation is currently unstable/unpredictable seen in pt's presentation of continued need for medical management and monitoring, increased pain and impaired strength and balance increasing risk for falls  Pt to benefit from continued PT tx to address deficits as defined above and maximize level of functional independent mobility and consistency  From PT/mobility standpoint, recommendation at time of d/c would be home with home health rehabilitation pending progress in order to facilitate return to PLOF     Barriers to Discharge Inaccessible home environment   Goals   Patient Goals "To get rid of this headache"   [facility] Expiration Date 08/31/21   Short Term Goal #1 In 7-10 days: Perform all transfers independently to improve independence, Ambulate > 200 ft  with least restrictive assistive device independently w/o LOB and w/ normalized gait pattern 100% of the time and PT to see and establish goals for stair negotiation when appropriate   Plan   Treatment/Interventions Functional transfer training;LE strengthening/ROM; Elevations; Therapeutic exercise; Endurance training;Bed mobility;Gait training;Spoke to nursing   PT Frequency Other (Comment)  (3-5x/wk)   Recommendation   PT Discharge Recommendation Home with home health rehabilitation   PT - OK to Discharge Yes   Additional Comments if to home with family support and home PT   AM-PAC Basic Mobility Inpatient   Turning in Bed Without Bedrails 3   Lying on Back to Sitting on Edge of Flat Bed 3   Moving Bed to Chair 3   Standing Up From Chair 3   Walk in Room 3   Climb 3-5 Stairs 3   Basic Mobility Inpatient Raw Score 18   Basic Mobility Standardized Score 41 05   Modified Ja Scale   Modified [place] Scale 3   Barthel Index   Feeding 10   Bathing 0   Grooming Score 5   Dressing Score 10   Bladder Score 10   Bowels Score 10   Toilet Use Score 5   Transfers (Bed/Chair) Score 10   Mobility (Level Surface) Score 0   Stairs Score 0   Barthel Index Score 60           Melissa Evans, PT

## 2021-08-21 NOTE — DISCHARGE INSTRUCTIONS
Chronic Hypertension, Ambulatory Care   GENERAL INFORMATION:   Chronic hypertension  is a long-term condition in which your blood pressure (BP) is higher than normal  Your BP is the force of your blood moving against the walls of your arteries  Hypertension is a BP of 140/90 or higher  Common symptoms include the following:   · Headache     · Blurred vision    · Chest pain     · Dizziness or weakness     · Trouble breathing     · Nosebleeds  Seek immediate care for the following symptoms:   · Severe headache or vision loss    · Weakness in an arm or leg    · Confusion or difficulty speaking    · Discomfort in your chest that feels like squeezing, pressure, fullness, or pain    · Suddenly feeling lightheaded or trouble breathing    · Pain or discomfort in your back, neck, jaw, stomach, or arm  Treatment for chronic hypertension  may include medicine to lower your BP  You may also need to make lifestyle changes  Take your medicine exactly as directed  Manage chronic hypertension:   · Take your BP at home  Sit and rest for 5 minutes before you take your BP  Extend your arm and support it on a flat surface  Your arm should be at the same level as your heart  Follow the directions that came with your BP monitor  If possible, take at least 2 BP readings each time  Take your BP at least twice a day at the same times each day, such as morning and evening  Keep a log of your BP readings and bring it to your follow-up visits  · Eat less sodium (salt)  Do not add sodium to your food  Limit foods that are high in sodium, such as canned foods, potato chips, and cold cuts  Your healthcare provider may suggest that you follow the [address] [place] [address] Street  The plan is low in sodium, unhealthy fats, and total fat  It is high in potassium, calcium, and fiber  · Exercise regularly  Exercise at least 30 minutes per day, on most days of the week  This will help decrease your BP   Ask your healthcare provider about the best exercise plan for you  · Limit alcohol  Women should limit alcohol to 1 drink a day  Men should limit alcohol to 2 drinks a day  A drink of alcohol is 12 ounces of beer, 5 ounces of wine, or 1½ ounces of liquor  · Do not smoke  If you smoke, it is never too late to quit  Smoking can increase your BP  Smoking also worsens other health conditions you may have that can increase your risk for hypertension  Ask your healthcare provider for information if you need help quitting  Follow up with your healthcare provider as directed: You will need to return to have your BP checked and to have other lab tests done  Write down your questions so you remember to ask them during your visits  CARE AGREEMENT:   You have the right to help plan your care  Learn about your health condition and how it may be treated  Discuss treatment options with your caregivers to decide what care you want to receive  You always have the right to refuse treatment  The above information is an  only  It is not intended as medical advice for individual conditions or treatments  Talk to your doctor, nurse or pharmacist before following any medical regimen to see if it is safe and effective for you  © 2014 [address] is for End User's use only and may not be sold, redistributed or otherwise used for commercial purposes  All illustrations and images included in CareNotes® are the copyrighted property of A D A M [employer]  or Kain Costello

## 2021-08-21 NOTE — ASSESSMENT & PLAN NOTE
· BP on presentation 212/99  · Labetolol given in ED  · Reported bradycardia following dose  · Hold further labetolol due to low BP and TIA pressure maintenance   · Latest /82  · Keeping BP elevated due to recent TIA   · Consider reduction with Hydralazine if SBP >200

## 2021-08-21 NOTE — ASSESSMENT & PLAN NOTE
· Restarted home medication  Blood pressure improved now  · Evaluated by Neurology  MRI brain negative    · Cleared by Neurology for discharge  · Will continue aspirin and statin along with home medications

## 2021-08-21 NOTE — DISCHARGE INSTR - AVS FIRST PAGE
Follow-up with PCP as soon as possible after discharge  Follow-up with neurology as outpatient  Check blood pressure regularly at home  Keep record and follow-up with PCP  Come back to the emergency room if condition worsen or recur  2D echo as outpatient  Follow up with cardiology as outpatient

## 2021-08-21 NOTE — H&P
[address] [facility] [place] 1965, 64 y o  female MRN: [id number]  Unit/Bed#: FT 02 Encounter: [id number]  Primary Care Provider: No primary care provider on file  Date and time admitted to hospital: 8/20/2021  5:19 PM    * Stroke-like symptoms  Assessment & Plan  · Patient reports not taking home medications for the past 3 days per the recommendations of a liver specialist at [place] [facility]  Also reports constant headache for past three days  Today at 10am the patient noticed a right-sided facial droop  ED arrival time 5  · Glucose 162  · Bp 212/99 on presentation; labetolol given in ED latest /82  · Troponin, CBC, and BMP WNL  · COVID negative  · Head CT and CTA showed no acute ischemia  · Aspirin administered in the ED  · Patient continues to have mild right-sided nasolabial fold relaxation and upper and lower right extremity weakness on physical exam  Patient reports right upper/lower extremity weakness is chronic and due to existing back problems  With CT of head showing no acute ischemia TIA is likely  · MRI ordered  · Echo ordered  · Lipid panel ordered  · Speech evaluation ordered; holding diet pending speech evaluation  · Continue neuro checks  · Nutrition services consulted   · Physical medicine consulted  · PT/OT eval ordered  · Neurology consulted; their input is greatly appreciated       Type 2 diabetes mellitus, with long-term current use of insulin (HCC)  Assessment & Plan    No results for input(s): POCGLU in the last 72 hours        · A1C 13 6 on 8/5  · Glucose 162 on admission  · Continue home levemir   · SSI insulin ordered   · Nutrition services consulted    Essential hypertension  Assessment & Plan  · BP on presentation 212/99  · Labetolol given in ED  · Reported bradycardia following dose  · Hold further labetolol due to low BP and TIA pressure maintenance   · Latest /82  · Keeping BP elevated due to recent TIA   · Consider reduction with Hydralazine if SBP >200      Dyslipidemia  Assessment & Plan  · Ordered lipid panel   · Continue home statin    Gastroesophageal reflux disease  Assessment & Plan  · Continue home PPI    VTE Pharmacologic Prophylaxis: VTE Score: 3 Moderate Risk (Score 3-4) - Pharmacological DVT Prophylaxis Ordered: heparin  Code Status: Level 1 - Full Code   Discussion with family: Patient declined call to   Anticipated Length of Stay: Patient will be admitted on an observation basis with an anticipated length of stay of less than 2 midnights secondary to stroke-like symptoms  Total Time for Visit, including Counseling / Coordination of Care: 70 minutes Greater than 50% of this total time spent on direct patient counseling and coordination of care  Chief Complaint:   Right-sided facial droop     History of Present Illness:  Sana Schwarz is a 64 y o  female with a PMH of type II DM, hypertension, hyperlipidemia,GERD, RA, fibromyalgia, and obesity who presents with right sided facial droop and right upper and lower extremity weakness  Patient reports her upper and lower extremity weakness are chronic and stem from her known back problems  She reports she's been holding all home medications except her DM meds per the request of a [place] [facility] liver specialist  For these past three days she's had a whole-head headache without visual disturbance  Today at 10am she noticed a right-sided facial droop which did not improve  She then decided to go to the ED later this afternoon  All questions were answered at the bedside and the patient is agreeable to observation  Review of Systems:  Review of Systems   Constitutional: Negative for activity change, appetite change, chills, diaphoresis, fatigue and fever  HENT: Negative for congestion, ear pain, nosebleeds and trouble swallowing  Eyes: Negative for pain and visual disturbance     Respiratory: Negative for apnea, cough, chest tightness, shortness of breath and wheezing  Cardiovascular: Negative for chest pain, palpitations and leg swelling  Gastrointestinal: Negative for abdominal distention, abdominal pain, blood in stool, constipation, diarrhea, nausea and vomiting  Endocrine: Negative for cold intolerance, heat intolerance and polyuria  Genitourinary: Negative for difficulty urinating, dysuria, flank pain and hematuria  Musculoskeletal: Negative for arthralgias, neck pain and neck stiffness  Skin: Negative for color change, rash and wound  Neurological: Positive for facial asymmetry, weakness and headaches  Negative for dizziness, tremors, syncope, light-headedness and numbness  Hematological: Negative for adenopathy  All other systems reviewed and are negative  Past Medical and Surgical History:   Past Medical History:   Diagnosis Date    Hypertension     Type 2 diabetes mellitus ([facility] [address] )        Past Surgical History:   Procedure Laterality Date    BACK SURGERY         Meds/Allergies:  Prior to Admission medications    Medication Sig Start Date End Date Taking?  Authorizing Provider   amitriptyline (ELAVIL) 50 mg tablet Take 50 mg by mouth daily 5/25/21  Yes Historical Provider, MD   bismuth-metronidazole-tetracycline (Pylera) 140-125-125 MG per capsule Take 3 capsules by mouth 4 (four) times a day 8/5/21  Yes Historical Provider, MD   famotidine (PEPCID) 40 MG tablet Take 40 mg by mouth daily 6/3/21  Yes Historical Provider, MD   insulin aspart (NovoLOG) 100 units/mL injection Inject under the skin   Yes Historical Provider, MD   insulin detemir (LEVEMIR) 100 units/mL subcutaneous injection Inject under the skin   Yes Historical Provider, MD   metFORMIN (GLUCOPHAGE) 1000 MG tablet Take 1,000 mg by mouth 2 (two) times a day with meals   Yes Historical Provider, MD   NIFEDIPINE PO Take by mouth   Yes Historical Provider, MD   omeprazole (PriLOSEC) 40 MG capsule Take 40 mg by mouth daily 6/3/21  Yes Historical Provider, MD QUEtiapine (SEROquel) 100 mg tablet Take 100 mg by mouth daily 5/24/21  Yes Historical Provider, MD   valsartan (DIOVAN) 320 MG tablet Take 320 mg by mouth daily   Yes Historical Provider, MD   venlafaxine (EFFEXOR-XR) 150 mg 24 hr capsule Take 150 mg by mouth daily 5/24/21  Yes Historical Provider, MD   venlafaxine (EFFEXOR-XR) 37 5 mg 24 hr capsule Take 37 5 mg by mouth daily 5/24/21  Yes Historical Provider, MD     I have reviewed home medications with patient personally  Allergies: Allergies   Allergen Reactions    Ciprofloxacin Other (See Comments)     Liver damage    Penicillins Rash       Social History:  Marital Status: /Civil Union   Occupation: N/A  Patient Pre-hospital Living Situation: Home  Patient Pre-hospital Level of Mobility: walks  Patient Pre-hospital Diet Restrictions: None  Substance Use History:   Social History     Substance and Sexual Activity   Alcohol Use None     Social History     Tobacco Use   Smoking Status Current Every Day Smoker    Packs/day: 0 50   Smokeless Tobacco Never Used     Social History     Substance and Sexual Activity   Drug Use Not on file       Family History:  History reviewed  No pertinent family history  Physical Exam:     Vitals:   Blood Pressure: 168/74 (08/20/21 2030)  Pulse: (!) 50 (08/20/21 2030)  Temperature: 97 9 °F (36 6 °C) (08/20/21 1511)  Temp Source: Oral (08/20/21 1511)  Respirations: 20 (08/20/21 2030)  Weight - Scale: 80 1 kg (176 lb 9 4 oz) (08/20/21 1813)  SpO2: 98 % (08/20/21 2030)    Physical Exam  Vitals and nursing note reviewed  Constitutional:       General: She is not in acute distress  Appearance: Normal appearance  She is obese  HENT:      Head: Normocephalic and atraumatic  Right Ear: External ear normal       Left Ear: External ear normal       Nose: Nose normal       Mouth/Throat:      Mouth: Mucous membranes are moist    Eyes:      Pupils: Pupils are equal, round, and reactive to light     Cardiovascular: Rate and Rhythm: Normal rate and regular rhythm  Pulses: Normal pulses  Heart sounds: Normal heart sounds  No murmur heard  Pulmonary:      Effort: Pulmonary effort is normal  No respiratory distress  Breath sounds: Normal breath sounds  No wheezing or rales  Chest:      Chest wall: No tenderness  Abdominal:      General: Bowel sounds are normal  There is no distension  Palpations: Abdomen is soft  There is no mass  Tenderness: There is no abdominal tenderness  There is no guarding  Musculoskeletal:         General: No swelling or tenderness  Cervical back: Normal range of motion and neck supple  No rigidity or tenderness  Right lower leg: No edema  Left lower leg: No edema  Skin:     General: Skin is warm and dry  Capillary Refill: Capillary refill takes less than 2 seconds  Findings: No lesion or rash  Neurological:      Mental Status: She is alert  Comments: Mild right nasolabial droop, right upper and lower extremity weakness  Psychiatric:         Mood and Affect: Mood normal          Additional Data:     Lab Results:  Results from last 7 days   Lab Units 08/20/21  1751   WBC Thousand/uL 8 94   HEMOGLOBIN g/dL 13 5   HEMATOCRIT % 42 6   PLATELETS Thousands/uL 301     Results from last 7 days   Lab Units 08/20/21  1751   SODIUM mmol/L 139   POTASSIUM mmol/L 4 3   CHLORIDE mmol/L 105   CO2 mmol/L 26   BUN mg/dL 16   CREATININE mg/dL 1 07   ANION GAP mmol/L 8   CALCIUM mg/dL 8 5   GLUCOSE RANDOM mg/dL 162*     Results from last 7 days   Lab Units 08/20/21  1751   INR  0 89                   Imaging: Reviewed radiology reports from this admission including: CT head and CTA head  CTA stroke alert (head/neck)   Final Result by Grayson Morris MD (08/20 1843)      No evidence of hemodynamic significant stenosis, aneurysm or dissection              I personally discussed this study with Adriana Mendenhall on 8/20/2021 at 6:00 PM  Workstation performed: [id number]         CT stroke alert brain   Final Result by Carl Bhatia MD (08/20 1811)      No acute intracranial abnormality  I personally discussed this study with Jimmie Feliciano on 8/20/2021 at 6:00 PM                 Workstation performed: [id number]         XR stroke alert portable chest    (Results Pending)   MRI Inpatient Order    (Results Pending)       EKG and Other Studies Reviewed on Admission:   · EKG: No EKG obtained  ** Please Note: This note has been constructed using a voice recognition system   **

## 2021-08-22 NOTE — DISCHARGE SUMMARY
[address] [facility]  Discharge- Erik Pam 1965, 64 y o  female MRN: [id number]  Unit/Bed#: ED 06 Encounter: [id number]  Primary Care Provider: No primary care provider on file  Date and time admitted to hospital: 8/20/2021  5:19 PM    Type 2 diabetes mellitus, with long-term current use of insulin [facility])  Assessment & Plan    Recent Labs     08/21/21  0037 08/21/21  0850 08/21/21  1139 08/21/21  1623   POCGLU 157* 142* 140 171*       (P) 152 5  · Resume home medication    Gastroesophageal reflux disease  Assessment & Plan  · Continue home PPI    Essential hypertension  Assessment & Plan  · Hypertensive urgency on admission  · Restarted home medication Norvasc 10 mg daily, Diovan and HCTZ  · Blood pressure better now  Last blood pressure 142/70 mmhg  · Recommended to follow blood pressure at home and follow-up with PCP      Dyslipidemia  Assessment & Plan  · Started on statin  Follow-up with PCP    * Hypertensive encephalopathy  Assessment & Plan  · Restarted home medication  Blood pressure improved now  · Evaluated by Neurology  MRI brain negative  · Cleared by Neurology for discharge  · Will continue aspirin and statin along with home medications          Discharging Physician / Practitioner: Belinda Cheng MD  PCP: No primary care provider on file    Admission Date:   Admission Orders (From admission, onward)     Ordered        08/20/21 1901  Place in Observation  Once                   Discharge Date: 08/21/21    Medical Problems     Resolved Problems  Date Reviewed: 8/20/2021    None                Consultations During Hospital Stay:  · Neurology    Procedures Performed:   · MRI brain  IMPRESSION:     Normal     CTA head  IMPRESSION:     No evidence of hemodynamic significant stenosis, aneurysm or dissection      Significant Findings / Test Results:   ·     Incidental Findings:   ·      Test Results Pending at Discharge (will require follow up):   ·      Outpatient Tests Requested:  · Echocardiogram, follow-up with PCP    Complications:      Reason for Admission:  Stroke-like symptoms    Hospital Course:     Luis Andrade is a 64 y o  female patient who originally presented to the hospital on 8/20/2021 due to stroke-like symptoms  Also had headache  Found to have hypertensive urgency in the emergency room  Placed on stroke pathway  CT head negative evaluated by Neurology  MRI brain negative  Her blood pressure medication was on hold due to permissive hypertension  Denies any chest pain or shortness of breath  troponin was negative on admission  Restarted on home medication amlodipine, losartan (substitute of Diovan), HCTZ  Blood pressure normalized  Headache also improved  Patient was advised to stay overnight for echocardiogram   She prefers to go home and do it as outpatient  Recommended to follow-up with PCP and Cardiology as outpatient  Her symptoms resolved  Denies any trouble walking  Does not need any home health or physical therapy per patient  Please see above list of diagnoses and related plan for additional information  Condition at Discharge: good     Discharge Day Visit / Exam:     Subjective:    Vitals: Blood Pressure: 146/71 (08/21/21 1702)  Pulse: 55 (08/21/21 1749)  Temperature: 98 1 °F (36 7 °C) (08/21/21 0851)  Temp Source: Oral (08/21/21 0851)  Respirations: 19 (08/21/21 1749)  Weight - Scale: 80 1 kg (176 lb 9 4 oz) (08/20/21 1813)  SpO2: 98 % (08/21/21 1725)  Exam:   Physical Exam  General- Awake, alert and oriented x 3, looks comfortable  HEENT- Normocephalic, atraumatic  CVS- Normal S1/ S2, Regular rate and rhythm  Respiratory system- B/L clear breath sounds  Abdomen- Soft, Non distended, no tenderness  CNS- No acute focal neurologic deficit noted  Discussion with Family:     Discharge instructions/Information to patient and family:   See after visit summary for information provided to patient and family        Provisions for Follow-Up Care:  See after visit summary for information related to follow-up care and any pertinent home health orders  Disposition:     Home    For Discharges to [facility] SNF:   · Not Applicable to this Patient - Not Applicable to this Patient    Planned Readmission:      Discharge Statement:  I spent 28 minutes discharging the patient  This time was spent on the day of discharge  I had direct contact with the patient on the day of discharge  Greater than 50% of the total time was spent examining patient, answering all patient questions, arranging and discussing plan of care with patient as well as directly providing post-discharge instructions  Additional time then spent on discharge activities  Discharge Medications:  See after visit summary for reconciled discharge medications provided to patient and family        ** Please Note: This note has been constructed using a voice recognition system **